# Patient Record
Sex: MALE | Race: WHITE | Employment: FULL TIME | URBAN - METROPOLITAN AREA
[De-identification: names, ages, dates, MRNs, and addresses within clinical notes are randomized per-mention and may not be internally consistent; named-entity substitution may affect disease eponyms.]

---

## 2018-03-27 ENCOUNTER — OFFICE VISIT (OUTPATIENT)
Dept: PRIMARY CARE CLINIC | Age: 38
End: 2018-03-27

## 2018-03-27 VITALS
SYSTOLIC BLOOD PRESSURE: 128 MMHG | BODY MASS INDEX: 26.34 KG/M2 | HEART RATE: 80 BPM | DIASTOLIC BLOOD PRESSURE: 80 MMHG | RESPIRATION RATE: 16 BRPM | WEIGHT: 167.8 LBS | HEIGHT: 67 IN | OXYGEN SATURATION: 99 % | TEMPERATURE: 97.9 F

## 2018-03-27 DIAGNOSIS — H69.83 DYSFUNCTION OF BOTH EUSTACHIAN TUBES: ICD-10-CM

## 2018-03-27 DIAGNOSIS — J06.9 ACUTE UPPER RESPIRATORY INFECTION: Primary | ICD-10-CM

## 2018-03-27 PROCEDURE — 99202 OFFICE O/P NEW SF 15 MIN: CPT | Performed by: NURSE PRACTITIONER

## 2018-03-27 RX ORDER — AMOXICILLIN AND CLAVULANATE POTASSIUM 875; 125 MG/1; MG/1
1 TABLET, FILM COATED ORAL 2 TIMES DAILY
Qty: 14 TABLET | Refills: 0 | Status: SHIPPED | OUTPATIENT
Start: 2018-03-27 | End: 2018-04-03

## 2018-03-27 RX ORDER — METHYLPREDNISOLONE 4 MG/1
TABLET ORAL
Qty: 1 KIT | Refills: 0 | Status: SHIPPED | OUTPATIENT
Start: 2018-03-27 | End: 2018-04-02

## 2018-03-27 RX ORDER — BENZONATATE 100 MG/1
100 CAPSULE ORAL 3 TIMES DAILY PRN
Qty: 21 CAPSULE | Refills: 0 | Status: SHIPPED | OUTPATIENT
Start: 2018-03-27 | End: 2018-04-03

## 2018-03-27 ASSESSMENT — ENCOUNTER SYMPTOMS
VOMITING: 0
ABDOMINAL PAIN: 0
SORE THROAT: 0
DIARRHEA: 0
NAUSEA: 0
SINUS PAIN: 0
EYES NEGATIVE: 1
WHEEZING: 0
GASTROINTESTINAL NEGATIVE: 1
SPUTUM PRODUCTION: 1
SHORTNESS OF BREATH: 0
COUGH: 1

## 2018-03-27 ASSESSMENT — PATIENT HEALTH QUESTIONNAIRE - PHQ9
SUM OF ALL RESPONSES TO PHQ9 QUESTIONS 1 & 2: 0
SUM OF ALL RESPONSES TO PHQ QUESTIONS 1-9: 0
2. FEELING DOWN, DEPRESSED OR HOPELESS: 0
1. LITTLE INTEREST OR PLEASURE IN DOING THINGS: 0

## 2018-03-27 NOTE — PROGRESS NOTES
Subjective     CC:   Chief Complaint   Patient presents with    URI     cough, chest congestion(clear), right ear pressure/popping, and chills intermitted x 1 week. has taken dayquil. CHERRY Chapman is a 44 yr male that presents for sinus symptoms x 1 week. Reports cough that recently became productive with clear sputum, a little dizziness/light headed, chills which have improved, mild congestion, R ear pain, decreased appetite. Denies sinus pain, fever, ear drainage, sore throat, n/v/abdominal pain, PND, chest pain, sob, wheezing. Has tried dayquil which relieves symptoms and cough drops. Did not get flu shot this year. Recent travel [facility] Former smoker-stopped 5-6 years ago. Previously smoker 10-12 years x 0.5 pack per day     Review of Systems   Constitutional: Positive for chills. Negative for fever. HENT: Positive for congestion and ear pain. Negative for ear discharge, sinus pain and sore throat. Eyes: Negative. Respiratory: Positive for cough and sputum production (clear). Negative for shortness of breath and wheezing. Cardiovascular: Negative for chest pain. Gastrointestinal: Negative. Negative for abdominal pain, diarrhea, nausea and vomiting. Genitourinary: Negative. Musculoskeletal: Negative. Skin: Negative. Neurological: Positive for dizziness (lightheaded). Negative for headaches. Psychiatric/Behavioral: Negative. Objective   Vitals:    03/27/18 1419   BP: 128/80   Site: Right Arm   Position: Sitting   Cuff Size: Small Adult   Pulse: 80   Resp: 16   Temp: 97.9 °F (36.6 °C)   TempSrc: Oral   SpO2: 99%   Weight: 167 lb 12.8 oz (76.1 kg)   Height: 5' 7.25\" (1.708 m)     Body mass index is 26.09 kg/m². Wt Readings from Last 3 Encounters:   03/27/18 167 lb 12.8 oz (76.1 kg)     BP Readings from Last 3 Encounters:   03/27/18 128/80      Physical Exam   Constitutional: He is oriented to person, place, and time.  Vital signs are normal. He appears well-developed and well-nourished. HENT:   Head: Normocephalic. Right Ear: Hearing, external ear and ear canal normal. Tympanic membrane is not injected, not scarred, not perforated, not erythematous, not retracted and not bulging. Left Ear: Hearing, external ear and ear canal normal. Tympanic membrane is not injected, not scarred, not perforated, not erythematous, not retracted and not bulging. Nose: Nose normal. Right sinus exhibits no maxillary sinus tenderness and no frontal sinus tenderness. Left sinus exhibits no maxillary sinus tenderness and no frontal sinus tenderness. Mouth/Throat: Uvula is midline, oropharynx is clear and moist and mucous membranes are normal.   R TM cloudy, L TM with effusion   Eyes: Conjunctivae and EOM are normal. Pupils are equal, round, and reactive to light. Neck: Normal range of motion. Neck supple. Cardiovascular: Normal rate, regular rhythm, normal heart sounds, intact distal pulses and normal pulses. Exam reveals no gallop and no friction rub. No murmur heard. Pulmonary/Chest: Effort normal and breath sounds normal. No respiratory distress. He has no wheezes. He has no rales. He exhibits no tenderness. No forced expiratory wheeze   Abdominal: Normal appearance. There is no tenderness. There is no guarding. Musculoskeletal: Normal range of motion. Lymphadenopathy:     He has no cervical adenopathy. Neurological: He is alert and oriented to person, place, and time. He has normal strength. Skin: Skin is warm and dry. Psychiatric: He has a normal mood and affect. His behavior is normal. Judgment and thought content normal.   Vitals reviewed. 1. Acute upper respiratory infection  methylPREDNISolone (MEDROL, EILEEN,) 4 MG tablet    benzonatate (TESSALON PERLES) 100 MG capsule    amoxicillin-clavulanate (AUGMENTIN) 875-125 MG per tablet   2. Dysfunction of both eustachian tubes  methylPREDNISolone (MEDROL, EILEEN,) 4 MG tablet           Plan   1.  Acute upper respiratory

## 2019-06-27 ENCOUNTER — OFFICE VISIT (OUTPATIENT)
Dept: FAMILY MEDICINE CLINIC | Age: 39
End: 2019-06-27
Payer: COMMERCIAL

## 2019-06-27 VITALS
HEART RATE: 104 BPM | SYSTOLIC BLOOD PRESSURE: 132 MMHG | OXYGEN SATURATION: 97 % | BODY MASS INDEX: 23.9 KG/M2 | DIASTOLIC BLOOD PRESSURE: 74 MMHG | HEIGHT: 67 IN | WEIGHT: 152.25 LBS

## 2019-06-27 DIAGNOSIS — K21.9 GASTROESOPHAGEAL REFLUX DISEASE WITHOUT ESOPHAGITIS: ICD-10-CM

## 2019-06-27 DIAGNOSIS — R10.13 EPIGASTRIC PAIN: Primary | ICD-10-CM

## 2019-06-27 DIAGNOSIS — F10.10 ALCOHOL ABUSE: ICD-10-CM

## 2019-06-27 DIAGNOSIS — R19.7 DIARRHEA, UNSPECIFIED TYPE: ICD-10-CM

## 2019-06-27 LAB
BASOPHILS ABSOLUTE: 0 K/UL (ref 0–0.2)
BASOPHILS RELATIVE PERCENT: 0.5 %
EOSINOPHILS ABSOLUTE: 0.1 K/UL (ref 0–0.6)
EOSINOPHILS RELATIVE PERCENT: 0.9 %
HCT VFR BLD CALC: 41 % (ref 40.5–52.5)
HEMOGLOBIN: 14 G/DL (ref 13.5–17.5)
LYMPHOCYTES ABSOLUTE: 1.5 K/UL (ref 1–5.1)
LYMPHOCYTES RELATIVE PERCENT: 22.6 %
MCH RBC QN AUTO: 33.4 PG (ref 26–34)
MCHC RBC AUTO-ENTMCNC: 34.2 G/DL (ref 31–36)
MCV RBC AUTO: 97.6 FL (ref 80–100)
MONOCYTES ABSOLUTE: 0.4 K/UL (ref 0–1.3)
MONOCYTES RELATIVE PERCENT: 5.9 %
NEUTROPHILS ABSOLUTE: 4.7 K/UL (ref 1.7–7.7)
NEUTROPHILS RELATIVE PERCENT: 70.1 %
PDW BLD-RTO: 12.8 % (ref 12.4–15.4)
PLATELET # BLD: 237 K/UL (ref 135–450)
PMV BLD AUTO: 10.1 FL (ref 5–10.5)
RBC # BLD: 4.2 M/UL (ref 4.2–5.9)
WBC # BLD: 6.7 K/UL (ref 4–11)

## 2019-06-27 PROCEDURE — 1036F TOBACCO NON-USER: CPT | Performed by: INTERNAL MEDICINE

## 2019-06-27 PROCEDURE — 99203 OFFICE O/P NEW LOW 30 MIN: CPT | Performed by: INTERNAL MEDICINE

## 2019-06-27 PROCEDURE — 36415 COLL VENOUS BLD VENIPUNCTURE: CPT | Performed by: INTERNAL MEDICINE

## 2019-06-27 PROCEDURE — G8420 CALC BMI NORM PARAMETERS: HCPCS | Performed by: INTERNAL MEDICINE

## 2019-06-27 PROCEDURE — G8427 DOCREV CUR MEDS BY ELIG CLIN: HCPCS | Performed by: INTERNAL MEDICINE

## 2019-06-27 RX ORDER — OMEPRAZOLE 40 MG/1
40 CAPSULE, DELAYED RELEASE ORAL
Qty: 30 CAPSULE | Refills: 5 | Status: SHIPPED | OUTPATIENT
Start: 2019-06-27 | End: 2022-05-09

## 2019-06-27 ASSESSMENT — PATIENT HEALTH QUESTIONNAIRE - PHQ9
SUM OF ALL RESPONSES TO PHQ QUESTIONS 1-9: 0
1. LITTLE INTEREST OR PLEASURE IN DOING THINGS: 0
SUM OF ALL RESPONSES TO PHQ QUESTIONS 1-9: 0
2. FEELING DOWN, DEPRESSED OR HOPELESS: 0
SUM OF ALL RESPONSES TO PHQ9 QUESTIONS 1 & 2: 0

## 2019-06-27 NOTE — PROGRESS NOTES
Calvin Raines   1980      Reason for visit:   Chief Complaint   Patient presents with   Agnes Jose L Ndiaye New Doctor    Irritable Bowel Syndrome        HPI:  Patient presents to [facility]. He has not seen his doctor for a year and their office closed. He is generally healthy. He takes no prescription  medications. He does take OTC prilosec for GERD. He feels the OTC is effective but he does not follow a good diet. His BP is a bit borderline today but he reports it has never been a problem. He does have issues with diarrhea. He reports he started having this issue when he traveled in May to [place] [address] but he was not ill at that time. He tried immodium without relief. He does admit to epigastric pain that started the same time. Usually occurs in the morning and tends to go away after he has a BM. He does have cramping in his abdomen. No blood in his stool that he has seen. 2 loose stools per day. No oily stools. His grandfather  of colon cancer. He drinks 5 beers per day for the last 10 years. He does not take any NSAIDs. He does not smoke - quit 6 years ago. History reviewed. No pertinent past medical history. Current Outpatient Medications:     omeprazole (PRILOSEC) 40 MG delayed release capsule, Take 1 capsule by mouth every morning (before breakfast), Disp: 30 capsule, Rfl: 5     No Known Allergies    History reviewed. No pertinent surgical history.      Family History   Problem Relation Age of Onset    No Known Problems Mother     High Blood Pressure Father     High Cholesterol Father     No Known Problems Brother     Cancer Paternal Grandfather         Social History     Socioeconomic History    Marital status: Single     Spouse name: Not on file    Number of children: Not on file    Years of education: Not on file    Highest education level: Not on file   Occupational History    Not on file   Social Needs    Financial resource strain: Not on file    Food insecurity:     Worry: Not on file     Inability: Not on file    Transportation needs:     Medical: Not on file     Non-medical: Not on file   Tobacco Use    Smoking status: Former Smoker     Packs/day: 0.50     Types: Cigarettes     Last attempt to quit: 2013     Years since quittin.4    Smokeless tobacco: Never Used   Substance and Sexual Activity    Alcohol use: Yes     Alcohol/week: 3.6 oz     Types: 6 Cans of beer per week     Comment: social    Drug use: No    Sexual activity: Not Currently   Lifestyle    Physical activity:     Days per week: Not on file     Minutes per session: Not on file    Stress: Not on file   Relationships    Social connections:     Talks on phone: Not on file     Gets together: Not on file     Attends Mandaeism service: Not on file     Active member of club or organization: Not on file     Attends meetings of clubs or organizations: Not on file     Relationship status: Not on file    Intimate partner violence:     Fear of current or ex partner: Not on file     Emotionally abused: Not on file     Physically abused: Not on file     Forced sexual activity: Not on file   Other Topics Concern    Not on file   Social History Narrative    Not on file       Review of Systems   Constitutional: Negative for chills, fatigue, fever and unexpected weight change. HENT: Negative for congestion, ear pain, sore throat and trouble swallowing. Eyes: Negative for pain and redness. Respiratory: Negative for cough and shortness of breath. Cardiovascular: Negative for chest pain, palpitations and leg swelling. Gastrointestinal: Negative for+ abdominal pain, +diarrhea; no blood in stool, constipation,  nausea and vomiting. Endocrine: Negative for cold intolerance, heat intolerance, polydipsia and polyuria. Genitourinary: Negative for dysuria, frequency and hematuria. Musculoskeletal: Negative for arthralgias, myalgias and joint swelling. Skin: Negative for rash.    Neurological: Negative for weakness, numbness and headaches. Hematological: Negative for adenopathy. Does not bruise/bleed easily. Psychiatric/Behavioral: Negative for sleep disturbance. The patient is not nervous/anxious. No report of depression    Physical Exam:  /74   Pulse 104   Ht 5' 7\" (1.702 m)   Wt 152 lb 4 oz (69.1 kg)   SpO2 97%   BMI 23.85 kg/m²   Constitutional: appears well-developed and well-nourished. Head: Normocephalic and atraumatic. Eyes: Pupils are equal, round, and reactive to light. Conjunctivae and EOM are normal.   Right Ear: External ear normal. TM normal  Left Ear: External ear normal. TM normal  Nose: Nose normal.   Mouth/Throat: Oropharynx is clear and moist.   Cardiovascular: Normal rate, regular rhythm and normal heart sounds. No murmur heard. Pulmonary/Chest: Effort normal. No respiratory distress. No wheezes, rhonchi, or crackles  Abdominal: Soft. Bowel sounds are normal. No distension. There is  Tenderness in epigastrium. No rebound or guarding. Musculoskeletal: Normal range of motion. No edema, tenderness or deformity. Lymphadenopathy: No cervical adenopathy. Neurological: alert. No cranial nerve deficit. Skin: Skin is warm and dry. Capillary refill takes less than 2 seconds. No rash noted. Psychiatric: Normal mood and affect. Assessment and Plan: Alyse Rolon is a 35 y.o. male presenting today to establish care. Arely Goldman was seen today for established new doctor and irritable bowel syndrome. Diagnoses and all orders for this visit:    Epigastric pain  Suspect severe gastritis or peptic ulcer disease with alcohol as the inciting agent. We will run basic lab testing today. He will start prescription PPI. Discussed referral to gastroenterology for evaluation with endoscopy. He will consider this but he is inclined to hold off on it at this time.   I urged him to consider seeing them if pain does not improve with medication or should he develop any worsening symptoms including blood in his stool or melena. He voiced his agreement and understanding  -     CBC Auto Differential  -     Comprehensive Metabolic Panel  -     TSH with Reflex  -     Lipase  -     AFL - Stephanie Garza MD, Gastroenterology, [facility]    Alcohol abuse  We had a long discussion regarding his alcohol use and that that it poses to his health. Recommend cutting back to 3-4 drinks per week. He is going to work on this and we will see him in close follow-up to monitor his progress. Gastroesophageal reflux disease without esophagitis    Diarrhea, unspecified type  -     TSH with Reflex  -     AFL - Stephanie Garza MD, Gastroenterology, [facility]    Other orders  -     omeprazole (PRILOSEC) 40 MG delayed release capsule; Take 1 capsule by mouth every morning (before breakfast)    Return to clinic in 4 weeks    Anh Juan M.D. Internal Medicine and Pediatrics  [facility]    Please be advised that portions of this note were dictated with the use of Dragon which may result in linguistic errors. Please contact me should there be any questions.

## 2019-06-28 LAB
A/G RATIO: 1.8 (ref 1.1–2.2)
ALBUMIN SERPL-MCNC: 5.2 G/DL (ref 3.4–5)
ALP BLD-CCNC: 56 U/L (ref 40–129)
ALT SERPL-CCNC: 45 U/L (ref 10–40)
ANION GAP SERPL CALCULATED.3IONS-SCNC: 21 MMOL/L (ref 3–16)
AST SERPL-CCNC: 40 U/L (ref 15–37)
BILIRUB SERPL-MCNC: 0.3 MG/DL (ref 0–1)
BUN BLDV-MCNC: 12 MG/DL (ref 7–20)
CALCIUM SERPL-MCNC: 10.1 MG/DL (ref 8.3–10.6)
CHLORIDE BLD-SCNC: 98 MMOL/L (ref 99–110)
CO2: 21 MMOL/L (ref 21–32)
CREAT SERPL-MCNC: 0.8 MG/DL (ref 0.9–1.3)
GFR AFRICAN AMERICAN: >60
GFR NON-AFRICAN AMERICAN: >60
GLOBULIN: 2.9 G/DL
GLUCOSE BLD-MCNC: 114 MG/DL (ref 70–99)
LIPASE: 29 U/L (ref 13–60)
POTASSIUM SERPL-SCNC: 4.2 MMOL/L (ref 3.5–5.1)
SODIUM BLD-SCNC: 140 MMOL/L (ref 136–145)
TOTAL PROTEIN: 8.1 G/DL (ref 6.4–8.2)
TSH REFLEX: 1.78 UIU/ML (ref 0.27–4.2)

## 2019-07-02 ENCOUNTER — TELEPHONE (OUTPATIENT)
Dept: FAMILY MEDICINE CLINIC | Age: 39
End: 2019-07-02

## 2019-07-02 ENCOUNTER — HOSPITAL ENCOUNTER (OUTPATIENT)
Age: 39
Discharge: HOME | End: 2019-07-02
Payer: COMMERCIAL

## 2019-07-02 ENCOUNTER — OFFICE VISIT (OUTPATIENT)
Dept: FAMILY MEDICINE CLINIC | Age: 39
End: 2019-07-02
Payer: COMMERCIAL

## 2019-07-02 ENCOUNTER — HOSPITAL ENCOUNTER (OUTPATIENT)
Dept: GENERAL RADIOLOGY | Age: 39
Discharge: HOME | End: 2019-07-02
Payer: COMMERCIAL

## 2019-07-02 VITALS
WEIGHT: 172.5 LBS | SYSTOLIC BLOOD PRESSURE: 114 MMHG | HEART RATE: 101 BPM | BODY MASS INDEX: 27.07 KG/M2 | DIASTOLIC BLOOD PRESSURE: 78 MMHG | HEIGHT: 67 IN | OXYGEN SATURATION: 98 %

## 2019-07-02 DIAGNOSIS — S99.922A TOE INJURY, LEFT, INITIAL ENCOUNTER: ICD-10-CM

## 2019-07-02 DIAGNOSIS — S99.922A TOE INJURY, LEFT, INITIAL ENCOUNTER: Primary | ICD-10-CM

## 2019-07-02 PROCEDURE — G8427 DOCREV CUR MEDS BY ELIG CLIN: HCPCS | Performed by: INTERNAL MEDICINE

## 2019-07-02 PROCEDURE — G8419 CALC BMI OUT NRM PARAM NOF/U: HCPCS | Performed by: INTERNAL MEDICINE

## 2019-07-02 PROCEDURE — 99213 OFFICE O/P EST LOW 20 MIN: CPT | Performed by: INTERNAL MEDICINE

## 2019-07-02 PROCEDURE — 1036F TOBACCO NON-USER: CPT | Performed by: INTERNAL MEDICINE

## 2019-07-02 PROCEDURE — 73660 X-RAY EXAM OF TOE(S): CPT

## 2019-07-03 ENCOUNTER — TELEPHONE (OUTPATIENT)
Dept: FAMILY MEDICINE CLINIC | Age: 39
End: 2019-07-03

## 2019-07-03 NOTE — TELEPHONE ENCOUNTER
Pt called in regarding message about his xray. Informed pt of Dr. Alejandra Corcoran note. Pt says however the pain has moved into his ankle and he can't hardly walk.      Please give pt a call back: [phone]

## 2019-07-29 ENCOUNTER — OFFICE VISIT (OUTPATIENT)
Dept: FAMILY MEDICINE CLINIC | Age: 39
End: 2019-07-29
Payer: COMMERCIAL

## 2019-07-29 VITALS
DIASTOLIC BLOOD PRESSURE: 68 MMHG | BODY MASS INDEX: 24.33 KG/M2 | HEIGHT: 67 IN | SYSTOLIC BLOOD PRESSURE: 116 MMHG | OXYGEN SATURATION: 98 % | HEART RATE: 76 BPM | WEIGHT: 155 LBS

## 2019-07-29 DIAGNOSIS — R74.8 ELEVATED LIVER ENZYMES: ICD-10-CM

## 2019-07-29 DIAGNOSIS — K21.9 GASTROESOPHAGEAL REFLUX DISEASE WITHOUT ESOPHAGITIS: Primary | ICD-10-CM

## 2019-07-29 LAB
A/G RATIO: 1.7 (ref 1.1–2.2)
ALBUMIN SERPL-MCNC: 4.9 G/DL (ref 3.4–5)
ALP BLD-CCNC: 67 U/L (ref 40–129)
ALT SERPL-CCNC: 43 U/L (ref 10–40)
ANION GAP SERPL CALCULATED.3IONS-SCNC: 16 MMOL/L (ref 3–16)
AST SERPL-CCNC: 27 U/L (ref 15–37)
BILIRUB SERPL-MCNC: <0.2 MG/DL (ref 0–1)
BUN BLDV-MCNC: 12 MG/DL (ref 7–20)
CALCIUM SERPL-MCNC: 10.4 MG/DL (ref 8.3–10.6)
CHLORIDE BLD-SCNC: 99 MMOL/L (ref 99–110)
CO2: 26 MMOL/L (ref 21–32)
CREAT SERPL-MCNC: 0.8 MG/DL (ref 0.9–1.3)
GFR AFRICAN AMERICAN: >60
GFR NON-AFRICAN AMERICAN: >60
GLOBULIN: 2.9 G/DL
GLUCOSE BLD-MCNC: 94 MG/DL (ref 70–99)
POTASSIUM SERPL-SCNC: 4.2 MMOL/L (ref 3.5–5.1)
SODIUM BLD-SCNC: 141 MMOL/L (ref 136–145)
TOTAL PROTEIN: 7.8 G/DL (ref 6.4–8.2)

## 2019-07-29 PROCEDURE — 1036F TOBACCO NON-USER: CPT | Performed by: INTERNAL MEDICINE

## 2019-07-29 PROCEDURE — 36415 COLL VENOUS BLD VENIPUNCTURE: CPT | Performed by: INTERNAL MEDICINE

## 2019-07-29 PROCEDURE — G8420 CALC BMI NORM PARAMETERS: HCPCS | Performed by: INTERNAL MEDICINE

## 2019-07-29 PROCEDURE — G8427 DOCREV CUR MEDS BY ELIG CLIN: HCPCS | Performed by: INTERNAL MEDICINE

## 2019-07-29 PROCEDURE — 99213 OFFICE O/P EST LOW 20 MIN: CPT | Performed by: INTERNAL MEDICINE

## 2019-07-29 ASSESSMENT — ENCOUNTER SYMPTOMS
VOMITING: 0
CONSTIPATION: 0
NAUSEA: 0
BLOOD IN STOOL: 0
DIARRHEA: 0
ABDOMINAL PAIN: 0

## 2019-07-29 NOTE — PROGRESS NOTES
ASSESSMENT/PLAN:  1. Elevated liver enzymes  Suspect related to his alcohol intake. We will follow this up to trend. Consider ultrasound pending results  - Comprehensive Metabolic Panel    2. Gastroesophageal reflux disease without esophagitis  Continue PPI. We discussed tapering off this if he is able to, however, I suspect he will continue to have issues as long as he is drinking. Encouraged him to continue to cut back with goal to stop drinking altogether. He still has a referral to gastroenterology but does not plan on seeing them at this time, however, I urged him to do so should he have any recurrent symptoms or other concerns. He voiced his agreement understanding. Return in about 6 months (around 1/29/2020). An electronic signature was used to authenticate this note.     --Suzi Garces MD on 7/29/2019 at 11:13 AM

## 2020-04-02 ENCOUNTER — TELEPHONE (OUTPATIENT)
Dept: FAMILY MEDICINE CLINIC | Age: 40
End: 2020-04-02

## 2020-04-02 NOTE — TELEPHONE ENCOUNTER
Pt calling back stating needing cortisone injection in knee. Wanting to know where he could go for that. Please advise.  Please call Bernadette Willingham back at [phone]

## 2020-04-02 NOTE — TELEPHONE ENCOUNTER
Pt's mother Pancho All called in stating that PT has gout in his left foot and now his left knee is swollen. She thinks it may be a flare up but she's not sure. Did adv her that we are not seeing in patient visits (she wanted PT to come in for a cortisone injection). Explained to her that our doctors are doing virtual visits for now. She would like for some to reach out to her son to adv what he can do to have his knee examined.      Please call PT back on his number: [phone]

## 2020-04-03 ENCOUNTER — HOSPITAL ENCOUNTER (EMERGENCY)
Age: 40
Discharge: HOME | End: 2020-04-03
Payer: COMMERCIAL

## 2020-04-03 ENCOUNTER — APPOINTMENT (OUTPATIENT)
Dept: GENERAL RADIOLOGY | Age: 40
End: 2020-04-03
Payer: COMMERCIAL

## 2020-04-03 VITALS
RESPIRATION RATE: 20 BRPM | WEIGHT: 154.32 LBS | SYSTOLIC BLOOD PRESSURE: 147 MMHG | HEIGHT: 68 IN | BODY MASS INDEX: 23.39 KG/M2 | DIASTOLIC BLOOD PRESSURE: 84 MMHG | TEMPERATURE: 98.1 F | HEART RATE: 105 BPM | OXYGEN SATURATION: 100 %

## 2020-04-03 PROCEDURE — 73560 X-RAY EXAM OF KNEE 1 OR 2: CPT

## 2020-04-03 PROCEDURE — 99283 EMERGENCY DEPT VISIT LOW MDM: CPT

## 2020-04-03 RX ORDER — PREDNISONE 10 MG/1
TABLET ORAL
Qty: 18 TABLET | Refills: 0 | Status: SHIPPED | OUTPATIENT
Start: 2020-04-03 | End: 2020-04-13

## 2020-04-03 ASSESSMENT — PAIN DESCRIPTION - DESCRIPTORS: DESCRIPTORS: ACHING

## 2020-04-03 ASSESSMENT — PAIN DESCRIPTION - PAIN TYPE: TYPE: ACUTE PAIN

## 2020-04-03 ASSESSMENT — PAIN DESCRIPTION - LOCATION: LOCATION: KNEE

## 2020-04-03 ASSESSMENT — PAIN SCALES - GENERAL: PAINLEVEL_OUTOF10: 5

## 2020-04-03 ASSESSMENT — PAIN DESCRIPTION - FREQUENCY: FREQUENCY: CONTINUOUS

## 2020-04-03 ASSESSMENT — PAIN - FUNCTIONAL ASSESSMENT
PAIN_FUNCTIONAL_ASSESSMENT: ACTIVITIES ARE NOT PREVENTED
PAIN_FUNCTIONAL_ASSESSMENT: 0-10

## 2020-04-03 ASSESSMENT — PAIN DESCRIPTION - ORIENTATION: ORIENTATION: LEFT

## 2020-04-03 ASSESSMENT — PAIN DESCRIPTION - PROGRESSION: CLINICAL_PROGRESSION: NOT CHANGED

## 2020-04-03 NOTE — ED PROVIDER NOTES
**EVALUATED BY ADVANCED PRACTICE PROVIDER**        [address]      Pt Name: Feroz Huynh  IJB:[id number]  [place] 1980  Date of evaluation: 4/3/2020  Provider: Francesco Armenta PA-C      Chief Complaint:    Chief Complaint   Patient presents with    Knee Pain     L knee swollen and painful for 2 days. , No injury. HX of gout. Nursing Notes, Past Medical Hx, Past Surgical Hx, Social Hx, Allergies, and Family Hx were all reviewed and agreed with or any disagreements were addressed in the HPI.    HPI:  (Location, Duration, Timing, Severity, Quality, Assoc Sx, Context, Modifying factors)  This is a  35 y.o. male patient presenting with 3 days of progressive anterior left knee pain. He is . On his knees quite a bit. He has been  for many years. He is never had this happen before. He does have history of gout. Denies any fevers, chills or direct trauma. No recent injury such as an abrasion. PastMedical/Surgical History:  No past medical history on file. No past surgical history on file. Medications:  Previous Medications    OMEPRAZOLE (PRILOSEC) 40 MG DELAYED RELEASE CAPSULE    Take 1 capsule by mouth every morning (before breakfast)         Review of Systems:  Review of Systems  Positives and Pertinent negatives as per HPI. Except as noted above in the ROS, problem specific ROS was completed and is negative. Physical Exam:  Physical Exam  Vitals signs and nursing note reviewed. Constitutional:       Appearance: Normal appearance. He is well-developed and normal weight. HENT:      Head: Normocephalic and atraumatic. Right Ear: External ear normal.      Left Ear: External ear normal.   Eyes:      General: No scleral icterus. Right eye: No discharge. Left eye: No discharge.       Conjunctiva/sclera: Conjunctivae normal.   Neck:      Musculoskeletal: Normal range of motion and DISCONTINUED MEDICATIONS:  Discontinued Medications    No medications on file              (Please note the MDM and HPI sections of this note were completed with a voice recognition program.  Efforts were made to edit the dictations but occasionally words are mis-transcribed.)    Electronically signed, Makenna Ventura PA-C  04/03/20 1922

## 2020-04-03 NOTE — TELEPHONE ENCOUNTER
Not an option Ortho not seeing patients without threat of loss of function. Gout or arthritis do no qualify.

## 2020-11-17 RX ORDER — BENZONATATE 100 MG/1
100 CAPSULE ORAL 3 TIMES DAILY PRN
Qty: 30 CAPSULE | Refills: 0 | Status: SHIPPED | OUTPATIENT
Start: 2020-11-17 | End: 2020-11-27

## 2020-11-17 RX ORDER — BENZONATATE 100 MG/1
100 CAPSULE ORAL 3 TIMES DAILY PRN
Qty: 30 CAPSULE | Refills: 0 | Status: SHIPPED | OUTPATIENT
Start: 2020-11-17 | End: 2020-11-17 | Stop reason: SDUPTHER

## 2021-06-10 ENCOUNTER — OFFICE VISIT (OUTPATIENT)
Dept: ORTHOPEDIC SURGERY | Age: 41
End: 2021-06-10
Payer: COMMERCIAL

## 2021-06-10 ENCOUNTER — TELEPHONE (OUTPATIENT)
Dept: ORTHOPEDIC SURGERY | Age: 41
End: 2021-06-10

## 2021-06-10 VITALS
HEIGHT: 64 IN | SYSTOLIC BLOOD PRESSURE: 133 MMHG | WEIGHT: 154 LBS | HEART RATE: 82 BPM | BODY MASS INDEX: 26.29 KG/M2 | DIASTOLIC BLOOD PRESSURE: 88 MMHG

## 2021-06-10 DIAGNOSIS — M70.41 PREPATELLAR BURSITIS, RIGHT KNEE: ICD-10-CM

## 2021-06-10 DIAGNOSIS — M25.561 RIGHT KNEE PAIN, UNSPECIFIED CHRONICITY: Primary | ICD-10-CM

## 2021-06-10 PROCEDURE — G8427 DOCREV CUR MEDS BY ELIG CLIN: HCPCS | Performed by: PHYSICIAN ASSISTANT

## 2021-06-10 PROCEDURE — 1036F TOBACCO NON-USER: CPT | Performed by: PHYSICIAN ASSISTANT

## 2021-06-10 PROCEDURE — G8419 CALC BMI OUT NRM PARAM NOF/U: HCPCS | Performed by: PHYSICIAN ASSISTANT

## 2021-06-10 PROCEDURE — 99204 OFFICE O/P NEW MOD 45 MIN: CPT | Performed by: PHYSICIAN ASSISTANT

## 2021-06-10 RX ORDER — CEPHALEXIN 500 MG/1
500 CAPSULE ORAL 4 TIMES DAILY
Qty: 28 CAPSULE | Refills: 0 | Status: SHIPPED | OUTPATIENT
Start: 2021-06-10 | End: 2021-06-17

## 2021-06-10 RX ORDER — ALLOPURINOL 300 MG/1
TABLET ORAL WEEKLY
COMMUNITY
Start: 2021-03-25 | End: 2022-08-29 | Stop reason: SDUPTHER

## 2021-06-10 NOTE — PATIENT INSTRUCTIONS
if you can take an over-the-counter pain medicine, such as acetaminophen (Tylenol), ibuprofen (Advil, Motrin), or naproxen (Aleve). Be safe with medicines. Read and follow all instructions on the label. · To prevent and ease kneecap bursitis during work, play, and daily activities:  ? Wear kneepads when kneeling on hard surfaces. Avoid kneeling for too long at a time. ? Strengthen and stretch your leg muscles. ? Avoid deep knee bends. · You can slowly return to the activity that caused the pain, but do it with less effort until you can do it without pain or swelling. Be sure to warm up before and stretch after you do the activity. When should you call for help? Call your doctor now or seek immediate medical care if:    · You have a fever.     · You have increased swelling or redness in your knee area.     · You cannot use your knee, or the pain in your knee gets worse. Watch closely for changes in your health, and be sure to contact your doctor if:    · You have pain for 2 weeks or longer despite home treatment. Where can you learn more? Go to [email or web address] org and sign in to your [employer] account. Enter J071 in the [facility] box to learn more about \"Kneecap Bursitis: Care Instructions. \"     If you do not have an account, please click on the \"Sign Up Now\" link. Current as of: November 16, 2020               Content Version: 12.8  © 8243-1261 [employer]. Care instructions adapted under license by [facility] ([facility]). If you have questions about a medical condition or this instruction, always ask your healthcare professional. [place] [address] any warranty or liability for your use of this information.

## 2021-06-10 NOTE — PROGRESS NOTES
Subjective:      Patient ID: Tahira Arriola is a 38 y.o. male. Chief Complaint   Patient presents with    New Patient     right knee pain        HPI:   He is here for an initial evaluation of a new problem. Right knee pain. Symptoms began acutely 2021. He does not recall a specific injury. He does a lot of kneeling as an . Pain is primarily in the anterior aspect of the right knee. He denies fevers or chills. Pain Scale 8/10 VAS. Pain is worse with activity. Pain improves with elevation. Previous treatments have included NSAIDs without improvement. Review Of Systems:   A 14 point review of systems and history form completed by the patient has been reviewed. This scanned in the media tab of the patient's chart under today's date. As outlined in the HPI. Negative for fever or chills. Negative for polyp joint pain, swelling and stiffness. Negative for numbness or tingling. Personal history of gout. Currently on allopurinol. Past Medical History:   Diagnosis Date    Gout        Family History   Problem Relation Age of Onset    No Known Problems Mother     High Blood Pressure Father     High Cholesterol Father     No Known Problems Brother     Cancer Paternal Grandfather        History reviewed. No pertinent surgical history. Social History     Occupational History    Not on file   Tobacco Use    Smoking status: Former Smoker     Packs/day: 0.50     Types: Cigarettes     Quit date: 2013     Years since quittin.4    Smokeless tobacco: Never Used   Vaping Use    Vaping Use: Never used   Substance and Sexual Activity    Alcohol use:  Yes     Alcohol/week: 6.0 standard drinks     Types: 6 Cans of beer per week     Comment: social    Drug use: No    Sexual activity: Not Currently       Current Outpatient Medications   Medication Sig Dispense Refill    allopurinol (ZYLOPRIM) 300 MG tablet TAKE 1 TABLET BY MOUTH EVERY DAY      cephALEXin (Merril Helling) 500 MG capsule Take 1 capsule by mouth 4 times daily for 7 days 28 capsule 0    omeprazole (PRILOSEC) 40 MG delayed release capsule Take 1 capsule by mouth every morning (before breakfast) 30 capsule 5     No current facility-administered medications for this visit. Objective:     He is alert, oriented x 3, pleasant, well nourished, developed and in no   acute distress. /88 (Site: Left Upper Arm, Position: Sitting)   Pulse 82   Ht 5' 4\" (1.626 m)   Wt 154 lb (69.9 kg)   BMI 26.43 kg/m²      Examination of the right knee: Inspection of the skin minimal erythema over the anterior knee. .  Inspection of the soft tissues isolated soft tissue swelling over the prepatellar bursa and anterior knee. The overall alignment of the knee is neutral.  Gait is normal.  There is no intra-articular effusion. AROM:           PROM:  Extension-         0                   0  Flexion -           100                   100  There mild pain associated with ROM testing. Medial joint line is not tender to palpation. Lateral joint line is not tender to palpation. Pes anserine bursa is not tender to palpation. Patellar tendon is not tender to palpation. Quadriceps tendon is not tender to palpation. Collateral ligaments is not tender to palpation. Popliteal fossa is not tender to palpation. Varus Stress testing negative for pain or crepitus. Valgus Stress testing negative for pain or crepitus. Lachman's test negative for  ACL laxity. Anterior Drawer test negative for ACL laxity. Posterior Drawer test negative for PCL laxity. Moon's Test negative for meniscus tear. Patellar Compression testing negative for pain or crepitus. Extensor Mechanism is intact. Examination of the lower extremities are intact with sensation to light touch. Motor testing  5/5 in all major motor groups of the lower extremities. Negative Noble's Sign. SLR negative.       Examination of the lower extremities shows intact perfusion to all extremities. No cyanosis. Digits are warm to touch, capillary refill is less than 2 seconds. There is no edema noted. Examination of the skin over both lower extremities reveals: The skin to be intact without lacerations or abrasions. No significant erythema. No significant rashes or skin lesions. X Rays: performed in the office today:   AP Standing,Lateral and Sunrise of right Knee: Anterior soft tissue swelling noted in the prepatellar region. The alignment is anatomic. There are no radiographic findings to suggest fracture or dislocation. Additional Tests reviewed: none  Additional Outside Records reviewed: none    Diagnosis:       ICD-10-CM    1. Right knee pain, unspecified chronicity  M25.561 XR KNEE RIGHT (3 VIEWS)   2. Prepatellar bursitis, right knee  M70.41         Assessment and Plan:       Assessment:  Right anterior knee pain with right anterior knee swelling. I believe he has early prepatellar septic bursitis. He does have a history of gout but he does not have a joint effusion. Plan:  Medications-Keflex 500 mg 4 times daily. Strict elevation of the right lower extremity recommended. Warm compresses 3-4 times daily. Off work for the next 48 to 72 hours. Follow up-Monday, 6/14/2021  Call or return to clinic if these symptoms worsen or fail to improve as anticipated.

## 2021-06-14 ENCOUNTER — OFFICE VISIT (OUTPATIENT)
Dept: ORTHOPEDIC SURGERY | Age: 41
End: 2021-06-14
Payer: COMMERCIAL

## 2021-06-14 VITALS
HEIGHT: 64 IN | SYSTOLIC BLOOD PRESSURE: 126 MMHG | DIASTOLIC BLOOD PRESSURE: 78 MMHG | BODY MASS INDEX: 26.29 KG/M2 | WEIGHT: 154 LBS | HEART RATE: 91 BPM

## 2021-06-14 DIAGNOSIS — M70.41 PREPATELLAR BURSITIS, RIGHT KNEE: Primary | ICD-10-CM

## 2021-06-14 PROCEDURE — G8419 CALC BMI OUT NRM PARAM NOF/U: HCPCS | Performed by: PHYSICIAN ASSISTANT

## 2021-06-14 PROCEDURE — 99213 OFFICE O/P EST LOW 20 MIN: CPT | Performed by: PHYSICIAN ASSISTANT

## 2021-06-14 PROCEDURE — G8427 DOCREV CUR MEDS BY ELIG CLIN: HCPCS | Performed by: PHYSICIAN ASSISTANT

## 2021-06-14 PROCEDURE — 1036F TOBACCO NON-USER: CPT | Performed by: PHYSICIAN ASSISTANT

## 2021-06-15 NOTE — PROGRESS NOTES
Subjective:      Patient ID: Jack Staton is a 38 y.o. male. Chief Complaint   Patient presents with    Knee Pain     fu right knee pain        HPI:   He is here for follow up on his right knee, prepatellar septic bursitis. He has completed several days of antibiotics. He reports improvement of his symptoms. Review Of Systems:   Negative for fever or chills. Past Medical History:   Diagnosis Date    Gout        Family History   Problem Relation Age of Onset    No Known Problems Mother     High Blood Pressure Father     High Cholesterol Father     No Known Problems Brother     Cancer Paternal Grandfather        History reviewed. No pertinent surgical history. Social History     Occupational History    Not on file   Tobacco Use    Smoking status: Former Smoker     Packs/day: 0.50     Types: Cigarettes     Quit date: 2013     Years since quittin.4    Smokeless tobacco: Never Used   Vaping Use    Vaping Use: Never used   Substance and Sexual Activity    Alcohol use: Yes     Alcohol/week: 6.0 standard drinks     Types: 6 Cans of beer per week     Comment: social    Drug use: No    Sexual activity: Not Currently       Current Outpatient Medications   Medication Sig Dispense Refill    allopurinol (ZYLOPRIM) 300 MG tablet TAKE 1 TABLET BY MOUTH EVERY DAY      cephALEXin (KEFLEX) 500 MG capsule Take 1 capsule by mouth 4 times daily for 7 days 28 capsule 0    omeprazole (PRILOSEC) 40 MG delayed release capsule Take 1 capsule by mouth every morning (before breakfast) 30 capsule 5     No current facility-administered medications for this visit. Objective:     He is alert, oriented x 3, pleasant, well nourished, developed and in no   acute distress. /78   Pulse 91   Ht 5' 4\" (1.626 m)   Wt 154 lb (69.9 kg)   BMI 26.43 kg/m²      Examination of the right knee: Inspection of the skin minimal erythema over the anterior knee. .  Inspection of the soft tissues isolated soft tissue swelling over the prepatellar bursa and anterior knee. The overall alignment of the knee is neutral.  Gait is normal.  There is no intra-articular effusion. AROM:           PROM:  Extension-         0                   0  Flexion -           110                   110  There mild pain associated with ROM testing. Medial joint line is not tender to palpation. Lateral joint line is not tender to palpation. Pes anserine bursa is not tender to palpation. Patellar tendon is not tender to palpation. Quadriceps tendon is not tender to palpation. Collateral ligaments is not tender to palpation. Popliteal fossa is not tender to palpation. Varus Stress testing negative for pain or crepitus. Valgus Stress testing negative for pain or crepitus. Lachman's test negative for  ACL laxity. Anterior Drawer test negative for ACL laxity. Posterior Drawer test negative for PCL laxity. Moon's Test negative for meniscus tear. Patellar Compression testing negative for pain or crepitus. Extensor Mechanism is intact. X Rays: not performed in the office today:       Diagnosis:       ICD-10-CM    1. Prepatellar bursitis, right knee  M70.41         Assessment and Plan:       Assessment:  Improving right knee pain, swelling and erythema felt to be related to prepatellar bursitis. No joint effusion to suggest septic joint. Plan:  Medications-initial out the previously prescribed oral antibiotics, Keflex. PT-May begin gentle range of motion. Avoid kneeling or direct pressure on the anterior knee. Follow up- 1 week   Call or return to clinic if these symptoms worsen or fail to improve as anticipated.

## 2022-05-09 ENCOUNTER — OFFICE VISIT (OUTPATIENT)
Dept: FAMILY MEDICINE CLINIC | Age: 42
End: 2022-05-09
Payer: COMMERCIAL

## 2022-05-09 VITALS
HEART RATE: 100 BPM | TEMPERATURE: 98.3 F | OXYGEN SATURATION: 98 % | WEIGHT: 153 LBS | BODY MASS INDEX: 23.19 KG/M2 | DIASTOLIC BLOOD PRESSURE: 76 MMHG | SYSTOLIC BLOOD PRESSURE: 130 MMHG | HEIGHT: 68 IN

## 2022-05-09 DIAGNOSIS — F10.10 DYSFUNCTIONAL ALCOHOL USE: ICD-10-CM

## 2022-05-09 DIAGNOSIS — Z83.3 FAMILY HISTORY OF DIABETES MELLITUS: ICD-10-CM

## 2022-05-09 DIAGNOSIS — Z87.39 HISTORY OF GOUT: Primary | ICD-10-CM

## 2022-05-09 LAB
BASOPHILS ABSOLUTE: 0 K/UL (ref 0–0.2)
BASOPHILS RELATIVE PERCENT: 0.2 %
EOSINOPHILS ABSOLUTE: 0.1 K/UL (ref 0–0.6)
EOSINOPHILS RELATIVE PERCENT: 0.7 %
FOLATE: 15.66 NG/ML (ref 4.78–24.2)
HCT VFR BLD CALC: 40.6 % (ref 40.5–52.5)
HEMOGLOBIN: 14 G/DL (ref 13.5–17.5)
LYMPHOCYTES ABSOLUTE: 1.8 K/UL (ref 1–5.1)
LYMPHOCYTES RELATIVE PERCENT: 25.8 %
MCH RBC QN AUTO: 33 PG (ref 26–34)
MCHC RBC AUTO-ENTMCNC: 34.5 G/DL (ref 31–36)
MCV RBC AUTO: 95.7 FL (ref 80–100)
MONOCYTES ABSOLUTE: 0.4 K/UL (ref 0–1.3)
MONOCYTES RELATIVE PERCENT: 6.1 %
NEUTROPHILS ABSOLUTE: 4.8 K/UL (ref 1.7–7.7)
NEUTROPHILS RELATIVE PERCENT: 67.2 %
PDW BLD-RTO: 12.7 % (ref 12.4–15.4)
PLATELET # BLD: 244 K/UL (ref 135–450)
PMV BLD AUTO: 9.8 FL (ref 5–10.5)
RBC # BLD: 4.24 M/UL (ref 4.2–5.9)
VITAMIN B-12: 360 PG/ML (ref 211–911)
WBC # BLD: 7.2 K/UL (ref 4–11)

## 2022-05-09 PROCEDURE — 1036F TOBACCO NON-USER: CPT | Performed by: FAMILY MEDICINE

## 2022-05-09 PROCEDURE — G8427 DOCREV CUR MEDS BY ELIG CLIN: HCPCS | Performed by: FAMILY MEDICINE

## 2022-05-09 PROCEDURE — 99213 OFFICE O/P EST LOW 20 MIN: CPT | Performed by: FAMILY MEDICINE

## 2022-05-09 PROCEDURE — G8420 CALC BMI NORM PARAMETERS: HCPCS | Performed by: FAMILY MEDICINE

## 2022-05-09 PROCEDURE — 36415 COLL VENOUS BLD VENIPUNCTURE: CPT | Performed by: FAMILY MEDICINE

## 2022-05-09 RX ORDER — MULTIVITAMIN,THERAPEUTIC
1 TABLET ORAL DAILY
Qty: 30 TABLET | Refills: 3
Start: 2022-05-09 | End: 2022-08-29

## 2022-05-09 ASSESSMENT — PATIENT HEALTH QUESTIONNAIRE - PHQ9
SUM OF ALL RESPONSES TO PHQ QUESTIONS 1-9: 0
SUM OF ALL RESPONSES TO PHQ QUESTIONS 1-9: 0
1. LITTLE INTEREST OR PLEASURE IN DOING THINGS: 0
SUM OF ALL RESPONSES TO PHQ QUESTIONS 1-9: 0
2. FEELING DOWN, DEPRESSED OR HOPELESS: 0
SUM OF ALL RESPONSES TO PHQ9 QUESTIONS 1 & 2: 0
SUM OF ALL RESPONSES TO PHQ QUESTIONS 1-9: 0

## 2022-05-09 NOTE — PROGRESS NOTES
A/P:    Diagnosis Orders   1. History of gout  Comprehensive Metabolic Panel    CBC with Auto Differential   2. Family history of diabetes mellitus  Hemoglobin A1C   3. Dysfunctional alcohol use  Vitamin B12 & Folate     His gout is controlled, he will continue allopurinol and watching his diet, check above lab work    For his family history of diabetes, will check A1c, he is nonfasting today    For his dysfunctional alcohol use, he will think about cutting back, we discussed what is considered appropriate intake max for alcohol    Follow-up in 9 months or sooner if needed    O: /76   Pulse 100   Temp 98.3 °F (36.8 °C)   Ht 5' 8\" (1.727 m)   Wt 153 lb (69.4 kg)   SpO2 98%   BMI 23.26 kg/m²    Gen- NAD, pleasant  HEENT- Eyes without icterus or injection  Neck- Supple, no lymphadenopathy appreciated  Lungs- CTAB  Heart- RRR on my exam  Abd- Soft, non tender  Ext- No edema  Psych- Appropriate    S: CC-establish care  HPI-patient presents to establish care with new clinic provider. He reports that he is doing very well. He reports his gout is controlled on 1 allopurinol per week. He has no concerns today. He does drink at least 4 beers per day. He is CAGE negative.     ROS- ROS  Denies fever, chills, night sweats  Denies headaches, sore throat, ear pain  Denies chest pain, dyspnea, palpitations  Denies nausea, vomiting, diarrhea  Denies joint pain  Denies depression, anxiety  Denies rashes      Patient's history was reviewed and updated

## 2022-05-10 LAB
A/G RATIO: 1.6 (ref 1.1–2.2)
ALBUMIN SERPL-MCNC: 5.1 G/DL (ref 3.4–5)
ALP BLD-CCNC: 63 U/L (ref 40–129)
ALT SERPL-CCNC: 51 U/L (ref 10–40)
ANION GAP SERPL CALCULATED.3IONS-SCNC: 20 MMOL/L (ref 3–16)
AST SERPL-CCNC: 57 U/L (ref 15–37)
BILIRUB SERPL-MCNC: <0.2 MG/DL (ref 0–1)
BUN BLDV-MCNC: 14 MG/DL (ref 7–20)
CALCIUM SERPL-MCNC: 10.3 MG/DL (ref 8.3–10.6)
CHLORIDE BLD-SCNC: 99 MMOL/L (ref 99–110)
CO2: 20 MMOL/L (ref 21–32)
CREAT SERPL-MCNC: 1 MG/DL (ref 0.9–1.3)
ESTIMATED AVERAGE GLUCOSE: 111.2 MG/DL
GFR AFRICAN AMERICAN: >60
GFR NON-AFRICAN AMERICAN: >60
GLUCOSE BLD-MCNC: 125 MG/DL (ref 70–99)
HBA1C MFR BLD: 5.5 %
POTASSIUM SERPL-SCNC: 5.3 MMOL/L (ref 3.5–5.1)
SODIUM BLD-SCNC: 139 MMOL/L (ref 136–145)
TOTAL PROTEIN: 8.3 G/DL (ref 6.4–8.2)

## 2022-05-25 ENCOUNTER — TELEPHONE (OUTPATIENT)
Dept: FAMILY MEDICINE CLINIC | Age: 42
End: 2022-05-25

## 2022-05-25 RX ORDER — FOLIC ACID 1 MG/1
1 TABLET ORAL DAILY
Qty: 90 TABLET | Refills: 1 | COMMUNITY
Start: 2022-05-25 | End: 2022-08-29 | Stop reason: SDUPTHER

## 2022-05-25 RX ORDER — MULTIVIT-MIN/IRON FUM/FOLIC AC 7.5 MG-4
1 TABLET ORAL DAILY
Qty: 30 TABLET | Refills: 3 | COMMUNITY
Start: 2022-05-25 | End: 2022-08-29 | Stop reason: SDUPTHER

## 2022-05-25 NOTE — TELEPHONE ENCOUNTER
Please let Calista Hughes know his liver enzymes were a bit elevated. I would like him to work on cutting back on his alcohol. Alcohol will further damage the liver. His b12 is on the lower end of normal. With his alcohol use and this result, I would like him to take over the counter b12 7492 mcg daily, folic acid 1 mg daily, and a multivitamin daily. These vitamins are to help prevent deficiencies. I would like him to follow-up in 3 months.

## 2022-08-29 ENCOUNTER — OFFICE VISIT (OUTPATIENT)
Dept: FAMILY MEDICINE CLINIC | Age: 42
End: 2022-08-29
Payer: COMMERCIAL

## 2022-08-29 VITALS
HEIGHT: 68 IN | BODY MASS INDEX: 23.49 KG/M2 | WEIGHT: 155 LBS | DIASTOLIC BLOOD PRESSURE: 84 MMHG | OXYGEN SATURATION: 98 % | SYSTOLIC BLOOD PRESSURE: 131 MMHG | HEART RATE: 92 BPM

## 2022-08-29 DIAGNOSIS — F10.10 DYSFUNCTIONAL ALCOHOL USE: ICD-10-CM

## 2022-08-29 DIAGNOSIS — M10.9 GOUTY ARTHRITIS: ICD-10-CM

## 2022-08-29 DIAGNOSIS — R74.8 LIVER ENZYME ELEVATION: Primary | ICD-10-CM

## 2022-08-29 LAB
A/G RATIO: 1.8 (ref 1.1–2.2)
ALBUMIN SERPL-MCNC: 5.1 G/DL (ref 3.4–5)
ALP BLD-CCNC: 61 U/L (ref 40–129)
ALT SERPL-CCNC: 43 U/L (ref 10–40)
ANION GAP SERPL CALCULATED.3IONS-SCNC: 15 MMOL/L (ref 3–16)
AST SERPL-CCNC: 29 U/L (ref 15–37)
BILIRUB SERPL-MCNC: 0.3 MG/DL (ref 0–1)
BUN BLDV-MCNC: 11 MG/DL (ref 7–20)
CALCIUM SERPL-MCNC: 10.1 MG/DL (ref 8.3–10.6)
CHLORIDE BLD-SCNC: 96 MMOL/L (ref 99–110)
CO2: 25 MMOL/L (ref 21–32)
CREAT SERPL-MCNC: 1 MG/DL (ref 0.9–1.3)
GFR AFRICAN AMERICAN: >60
GFR NON-AFRICAN AMERICAN: >60
GLUCOSE BLD-MCNC: 134 MG/DL (ref 70–99)
POTASSIUM SERPL-SCNC: 4 MMOL/L (ref 3.5–5.1)
SODIUM BLD-SCNC: 136 MMOL/L (ref 136–145)
TOTAL PROTEIN: 7.9 G/DL (ref 6.4–8.2)

## 2022-08-29 PROCEDURE — G8427 DOCREV CUR MEDS BY ELIG CLIN: HCPCS | Performed by: FAMILY MEDICINE

## 2022-08-29 PROCEDURE — G8420 CALC BMI NORM PARAMETERS: HCPCS | Performed by: FAMILY MEDICINE

## 2022-08-29 PROCEDURE — 99214 OFFICE O/P EST MOD 30 MIN: CPT | Performed by: FAMILY MEDICINE

## 2022-08-29 PROCEDURE — 36415 COLL VENOUS BLD VENIPUNCTURE: CPT | Performed by: FAMILY MEDICINE

## 2022-08-29 PROCEDURE — 1036F TOBACCO NON-USER: CPT | Performed by: FAMILY MEDICINE

## 2022-08-29 RX ORDER — ALLOPURINOL 300 MG/1
300 TABLET ORAL WEEKLY
Qty: 12 TABLET | Refills: 3 | Status: SHIPPED | OUTPATIENT
Start: 2022-08-29

## 2022-08-29 RX ORDER — FOLIC ACID 1 MG/1
1 TABLET ORAL DAILY
Qty: 90 TABLET | Refills: 3 | Status: SHIPPED | OUTPATIENT
Start: 2022-08-29

## 2022-08-29 RX ORDER — MULTIVIT-MIN/IRON FUM/FOLIC AC 7.5 MG-4
1 TABLET ORAL DAILY
Qty: 90 TABLET | Refills: 3 | Status: SHIPPED | OUTPATIENT
Start: 2022-08-29

## 2022-08-29 NOTE — PROGRESS NOTES
A/P:    Diagnosis Orders   1. Liver enzyme elevation  Comprehensive Metabolic Panel      2. Gouty arthritis        3. Dysfunctional alcohol use          For his liver enzyme elevation, will check CMP today. Alcohol cessation encouraged. He reports he will continue to cut back a bit at a time. He is to let me know if he would like referral to counseling, or any other resources to help him quit drinking altogether. He agrees to continue multivitamin, Y96, folic acid. His gout is controlled. He will continue allopurinol. Follow-up in 6 months or sooner if needed. O: /84   Pulse 92   Ht 5' 8\" (1.727 m)   Wt 155 lb (70.3 kg)   SpO2 98%   BMI 23.57 kg/m²    Gen- NAD, pleasant  HEENT- Eyes without icterus or injection  Neck- Supple, no lymphadenopathy appreciated  Lungs- CTAB  Heart-regular rate and rhythm  Abd- Soft, non tender  Ext- No edema  Psych- Appropriate    S: CC-checkup  HPI-patient presents for follow-up of his liver enzyme elevation, gout. He reports his gout is controlled. He reports he is cutting back on his alcohol use. He has pretty much eliminated any liquor and is just drinking beer. He is considering cutting back some more. He reports his gout remains completely controlled. He ports compliance with his vitamins.     ROS-denies fever, chills, night sweats  Denies chest pain, dyspnea, palpitations  Denies abdominal pain, nausea, vomiting, diarrhea    Patient's medications, allergies, and past medical hx were reviewed

## 2022-09-02 ENCOUNTER — TELEPHONE (OUTPATIENT)
Dept: FAMILY MEDICINE CLINIC | Age: 42
End: 2022-09-02

## 2022-09-02 NOTE — TELEPHONE ENCOUNTER
Please let Kitzmiller Summer know his liver enzymes are looking better. I would like him to continue cutting back on his alcohol use. I would like him to follow-up in 6 months.

## 2023-03-09 ENCOUNTER — HOSPITAL ENCOUNTER (INPATIENT)
Age: 43
LOS: 1 days | Discharge: HOME | End: 2023-03-10
Attending: EMERGENCY MEDICINE | Admitting: STUDENT IN AN ORGANIZED HEALTH CARE EDUCATION/TRAINING PROGRAM
Payer: COMMERCIAL

## 2023-03-09 ENCOUNTER — APPOINTMENT (OUTPATIENT)
Dept: CT IMAGING | Age: 43
End: 2023-03-09
Payer: COMMERCIAL

## 2023-03-09 DIAGNOSIS — E86.0 DEHYDRATION: ICD-10-CM

## 2023-03-09 DIAGNOSIS — E87.20 LACTIC ACIDOSIS: ICD-10-CM

## 2023-03-09 DIAGNOSIS — R20.0 NUMBNESS: Primary | ICD-10-CM

## 2023-03-09 DIAGNOSIS — R25.2 CRAMP AND SPASM: ICD-10-CM

## 2023-03-09 DIAGNOSIS — K52.9 ENTEROCOLITIS: ICD-10-CM

## 2023-03-09 PROCEDURE — 99285 EMERGENCY DEPT VISIT HI MDM: CPT

## 2023-03-09 PROCEDURE — 70450 CT HEAD/BRAIN W/O DYE: CPT

## 2023-03-09 PROCEDURE — 93005 ELECTROCARDIOGRAM TRACING: CPT | Performed by: PHYSICIAN ASSISTANT

## 2023-03-09 RX ORDER — LORAZEPAM 2 MG/ML
1 INJECTION INTRAMUSCULAR ONCE
Status: COMPLETED | OUTPATIENT
Start: 2023-03-10 | End: 2023-03-10

## 2023-03-09 RX ORDER — 0.9 % SODIUM CHLORIDE 0.9 %
1000 INTRAVENOUS SOLUTION INTRAVENOUS ONCE
Status: COMPLETED | OUTPATIENT
Start: 2023-03-10 | End: 2023-03-10

## 2023-03-09 RX ORDER — DIPHENHYDRAMINE HYDROCHLORIDE 50 MG/ML
25 INJECTION INTRAMUSCULAR; INTRAVENOUS ONCE
Status: COMPLETED | OUTPATIENT
Start: 2023-03-10 | End: 2023-03-10

## 2023-03-09 ASSESSMENT — LIFESTYLE VARIABLES: HOW OFTEN DO YOU HAVE A DRINK CONTAINING ALCOHOL: 4 OR MORE TIMES A WEEK

## 2023-03-09 ASSESSMENT — PAIN - FUNCTIONAL ASSESSMENT: PAIN_FUNCTIONAL_ASSESSMENT: 0-10

## 2023-03-09 ASSESSMENT — PAIN SCALES - GENERAL: PAINLEVEL_OUTOF10: 0

## 2023-03-10 ENCOUNTER — APPOINTMENT (OUTPATIENT)
Dept: GENERAL RADIOLOGY | Age: 43
End: 2023-03-10
Payer: COMMERCIAL

## 2023-03-10 ENCOUNTER — APPOINTMENT (OUTPATIENT)
Dept: CT IMAGING | Age: 43
End: 2023-03-10
Payer: COMMERCIAL

## 2023-03-10 VITALS
RESPIRATION RATE: 20 BRPM | DIASTOLIC BLOOD PRESSURE: 93 MMHG | HEART RATE: 117 BPM | OXYGEN SATURATION: 98 % | WEIGHT: 154.76 LBS | SYSTOLIC BLOOD PRESSURE: 141 MMHG | TEMPERATURE: 98.4 F | BODY MASS INDEX: 23.53 KG/M2

## 2023-03-10 PROBLEM — F10.20 ALCOHOLISM (HCC): Status: ACTIVE | Noted: 2023-03-10

## 2023-03-10 PROBLEM — M62.838 MUSCLE SPASM: Status: ACTIVE | Noted: 2023-03-10

## 2023-03-10 PROBLEM — A41.9 SEPSIS (HCC): Status: ACTIVE | Noted: 2023-03-10

## 2023-03-10 PROBLEM — K21.00 GERD WITH ESOPHAGITIS: Status: ACTIVE | Noted: 2023-03-10

## 2023-03-10 PROBLEM — K52.9 COLITIS: Status: ACTIVE | Noted: 2023-03-10

## 2023-03-10 PROBLEM — A41.9 SEPSIS (HCC): Status: RESOLVED | Noted: 2023-03-10 | Resolved: 2023-03-10

## 2023-03-10 LAB
A/G RATIO: 1.3 (ref 1.1–2.2)
A/G RATIO: 1.5 (ref 1.1–2.2)
ALBUMIN SERPL-MCNC: 4.5 G/DL (ref 3.4–5)
ALBUMIN SERPL-MCNC: 5.2 G/DL (ref 3.4–5)
ALP BLD-CCNC: 54 U/L (ref 40–129)
ALP BLD-CCNC: 67 U/L (ref 40–129)
ALT SERPL-CCNC: 48 U/L (ref 10–40)
ALT SERPL-CCNC: 61 U/L (ref 10–40)
AMPHETAMINE SCREEN, URINE: NORMAL
ANION GAP SERPL CALCULATED.3IONS-SCNC: 16 MMOL/L (ref 3–16)
ANION GAP SERPL CALCULATED.3IONS-SCNC: 24 MMOL/L (ref 3–16)
AST SERPL-CCNC: 40 U/L (ref 15–37)
AST SERPL-CCNC: 54 U/L (ref 15–37)
BACTERIA: NORMAL /HPF
BARBITURATE SCREEN URINE: NORMAL
BASOPHILS ABSOLUTE: 0 K/UL (ref 0–0.2)
BASOPHILS ABSOLUTE: 0 K/UL (ref 0–0.2)
BASOPHILS RELATIVE PERCENT: 0.1 %
BASOPHILS RELATIVE PERCENT: 0.1 %
BENZODIAZEPINE SCREEN, URINE: NORMAL
BILIRUB SERPL-MCNC: 0.5 MG/DL (ref 0–1)
BILIRUB SERPL-MCNC: 0.7 MG/DL (ref 0–1)
BILIRUBIN URINE: NEGATIVE
BLOOD, URINE: ABNORMAL
BUN BLDV-MCNC: 10 MG/DL (ref 7–20)
BUN BLDV-MCNC: 9 MG/DL (ref 7–20)
C DIFF TOXIN/ANTIGEN: NORMAL
CALCIUM SERPL-MCNC: 10.6 MG/DL (ref 8.3–10.6)
CALCIUM SERPL-MCNC: 8.5 MG/DL (ref 8.3–10.6)
CANNABINOID SCREEN URINE: NORMAL
CHLORIDE BLD-SCNC: 102 MMOL/L (ref 99–110)
CHLORIDE BLD-SCNC: 96 MMOL/L (ref 99–110)
CLARITY: CLEAR
CO2: 18 MMOL/L (ref 21–32)
CO2: 19 MMOL/L (ref 21–32)
COCAINE METABOLITE SCREEN URINE: NORMAL
COLOR: YELLOW
CREAT SERPL-MCNC: 0.8 MG/DL (ref 0.9–1.3)
CREAT SERPL-MCNC: 0.9 MG/DL (ref 0.9–1.3)
EKG ATRIAL RATE: 133 BPM
EKG DIAGNOSIS: NORMAL
EKG P-R INTERVAL: 104 MS
EKG Q-T INTERVAL: 384 MS
EKG QRS DURATION: 78 MS
EKG QTC CALCULATION (BAZETT): 571 MS
EKG R AXIS: 77 DEGREES
EKG T AXIS: 78 DEGREES
EKG VENTRICULAR RATE: 133 BPM
EOSINOPHILS ABSOLUTE: 0 K/UL (ref 0–0.6)
EOSINOPHILS ABSOLUTE: 0 K/UL (ref 0–0.6)
EOSINOPHILS RELATIVE PERCENT: 0.1 %
EOSINOPHILS RELATIVE PERCENT: 0.1 %
EPITHELIAL CELLS, UA: 0 /HPF (ref 0–5)
ETHANOL: NORMAL MG/DL (ref 0–0.08)
FENTANYL SCREEN, URINE: NORMAL
GFR SERPL CREATININE-BSD FRML MDRD: >60 ML/MIN/{1.73_M2}
GFR SERPL CREATININE-BSD FRML MDRD: >60 ML/MIN/{1.73_M2}
GLUCOSE BLD-MCNC: 125 MG/DL (ref 70–99)
GLUCOSE BLD-MCNC: 126 MG/DL (ref 70–99)
GLUCOSE URINE: NEGATIVE MG/DL
HCT VFR BLD CALC: 38.4 % (ref 40.5–52.5)
HCT VFR BLD CALC: 43.5 % (ref 40.5–52.5)
HEMOGLOBIN: 13.1 G/DL (ref 13.5–17.5)
HEMOGLOBIN: 15 G/DL (ref 13.5–17.5)
HYALINE CASTS: 1 /LPF (ref 0–8)
KETONES, URINE: >=80 MG/DL
LACTIC ACID: 2 MMOL/L (ref 0.4–2)
LACTIC ACID: 5.5 MMOL/L (ref 0.4–2)
LEUKOCYTE ESTERASE, URINE: NEGATIVE
LYMPHOCYTES ABSOLUTE: 0.3 K/UL (ref 1–5.1)
LYMPHOCYTES ABSOLUTE: 0.8 K/UL (ref 1–5.1)
LYMPHOCYTES RELATIVE PERCENT: 2.8 %
LYMPHOCYTES RELATIVE PERCENT: 5.9 %
Lab: NORMAL
MAGNESIUM: 1.7 MG/DL (ref 1.8–2.4)
MAGNESIUM: 1.9 MG/DL (ref 1.8–2.4)
MCH RBC QN AUTO: 32 PG (ref 26–34)
MCH RBC QN AUTO: 32.4 PG (ref 26–34)
MCHC RBC AUTO-ENTMCNC: 34 G/DL (ref 31–36)
MCHC RBC AUTO-ENTMCNC: 34.5 G/DL (ref 31–36)
MCV RBC AUTO: 93 FL (ref 80–100)
MCV RBC AUTO: 95.3 FL (ref 80–100)
METHADONE SCREEN, URINE: NORMAL
MICROSCOPIC EXAMINATION: YES
MONOCYTES ABSOLUTE: 0.3 K/UL (ref 0–1.3)
MONOCYTES ABSOLUTE: 0.4 K/UL (ref 0–1.3)
MONOCYTES RELATIVE PERCENT: 2.6 %
MONOCYTES RELATIVE PERCENT: 3.2 %
NEUTROPHILS ABSOLUTE: 10.1 K/UL (ref 1.7–7.7)
NEUTROPHILS ABSOLUTE: 12.3 K/UL (ref 1.7–7.7)
NEUTROPHILS RELATIVE PERCENT: 90.7 %
NEUTROPHILS RELATIVE PERCENT: 94.4 %
NITRITE, URINE: NEGATIVE
OPIATE SCREEN URINE: NORMAL
OXYCODONE URINE: NORMAL
PDW BLD-RTO: 12.1 % (ref 12.4–15.4)
PDW BLD-RTO: 12.2 % (ref 12.4–15.4)
PH UA: 6
PH UA: 6 (ref 5–8)
PHENCYCLIDINE SCREEN URINE: NORMAL
PLATELET # BLD: 224 K/UL (ref 135–450)
PLATELET # BLD: 278 K/UL (ref 135–450)
PMV BLD AUTO: 10 FL (ref 5–10.5)
PMV BLD AUTO: 9.8 FL (ref 5–10.5)
POTASSIUM SERPL-SCNC: 3.9 MMOL/L (ref 3.5–5.1)
POTASSIUM SERPL-SCNC: 4.2 MMOL/L (ref 3.5–5.1)
PROTEIN UA: 30 MG/DL
RBC # BLD: 4.03 M/UL (ref 4.2–5.9)
RBC # BLD: 4.68 M/UL (ref 4.2–5.9)
RBC UA: 1 /HPF (ref 0–4)
SARS-COV-2, NAAT: NOT DETECTED
SODIUM BLD-SCNC: 137 MMOL/L (ref 136–145)
SODIUM BLD-SCNC: 138 MMOL/L (ref 136–145)
SPECIFIC GRAVITY UA: 1.02 (ref 1–1.03)
TOTAL CK: 328 U/L (ref 39–308)
TOTAL PROTEIN: 7.6 G/DL (ref 6.4–8.2)
TOTAL PROTEIN: 9.2 G/DL (ref 6.4–8.2)
TROPONIN: <0.01 NG/ML
URINE REFLEX TO CULTURE: ABNORMAL
URINE TYPE: ABNORMAL
UROBILINOGEN, URINE: 0.2 E.U./DL
WBC # BLD: 10.7 K/UL (ref 4–11)
WBC # BLD: 13.5 K/UL (ref 4–11)
WBC UA: 1 /HPF (ref 0–5)

## 2023-03-10 PROCEDURE — 96375 TX/PRO/DX INJ NEW DRUG ADDON: CPT

## 2023-03-10 PROCEDURE — 2580000003 HC RX 258: Performed by: STUDENT IN AN ORGANIZED HEALTH CARE EDUCATION/TRAINING PROGRAM

## 2023-03-10 PROCEDURE — 82550 ASSAY OF CK (CPK): CPT

## 2023-03-10 PROCEDURE — 6360000004 HC RX CONTRAST MEDICATION: Performed by: PHYSICIAN ASSISTANT

## 2023-03-10 PROCEDURE — 93010 ELECTROCARDIOGRAM REPORT: CPT | Performed by: INTERNAL MEDICINE

## 2023-03-10 PROCEDURE — 83735 ASSAY OF MAGNESIUM: CPT

## 2023-03-10 PROCEDURE — 71045 X-RAY EXAM CHEST 1 VIEW: CPT

## 2023-03-10 PROCEDURE — 2060000000 HC ICU INTERMEDIATE R&B

## 2023-03-10 PROCEDURE — 87449 NOS EACH ORGANISM AG IA: CPT

## 2023-03-10 PROCEDURE — 6370000000 HC RX 637 (ALT 250 FOR IP): Performed by: STUDENT IN AN ORGANIZED HEALTH CARE EDUCATION/TRAINING PROGRAM

## 2023-03-10 PROCEDURE — 87040 BLOOD CULTURE FOR BACTERIA: CPT

## 2023-03-10 PROCEDURE — 80307 DRUG TEST PRSMV CHEM ANLYZR: CPT

## 2023-03-10 PROCEDURE — 36415 COLL VENOUS BLD VENIPUNCTURE: CPT

## 2023-03-10 PROCEDURE — 6360000002 HC RX W HCPCS: Performed by: EMERGENCY MEDICINE

## 2023-03-10 PROCEDURE — 6360000002 HC RX W HCPCS: Performed by: STUDENT IN AN ORGANIZED HEALTH CARE EDUCATION/TRAINING PROGRAM

## 2023-03-10 PROCEDURE — 80053 COMPREHEN METABOLIC PANEL: CPT

## 2023-03-10 PROCEDURE — 96365 THER/PROPH/DIAG IV INF INIT: CPT

## 2023-03-10 PROCEDURE — 2580000003 HC RX 258: Performed by: PHYSICIAN ASSISTANT

## 2023-03-10 PROCEDURE — 82077 ASSAY SPEC XCP UR&BREATH IA: CPT

## 2023-03-10 PROCEDURE — 83605 ASSAY OF LACTIC ACID: CPT

## 2023-03-10 PROCEDURE — 2500000003 HC RX 250 WO HCPCS: Performed by: EMERGENCY MEDICINE

## 2023-03-10 PROCEDURE — 87635 SARS-COV-2 COVID-19 AMP PRB: CPT

## 2023-03-10 PROCEDURE — 2500000003 HC RX 250 WO HCPCS: Performed by: STUDENT IN AN ORGANIZED HEALTH CARE EDUCATION/TRAINING PROGRAM

## 2023-03-10 PROCEDURE — 81001 URINALYSIS AUTO W/SCOPE: CPT

## 2023-03-10 PROCEDURE — 85025 COMPLETE CBC W/AUTO DIFF WBC: CPT

## 2023-03-10 PROCEDURE — 74177 CT ABD & PELVIS W/CONTRAST: CPT

## 2023-03-10 PROCEDURE — 84484 ASSAY OF TROPONIN QUANT: CPT

## 2023-03-10 PROCEDURE — 87324 CLOSTRIDIUM AG IA: CPT

## 2023-03-10 PROCEDURE — 71260 CT THORAX DX C+: CPT | Performed by: PHYSICIAN ASSISTANT

## 2023-03-10 RX ORDER — CALCIUM CARBONATE 200(500)MG
500 TABLET,CHEWABLE ORAL ONCE
Status: COMPLETED | OUTPATIENT
Start: 2023-03-10 | End: 2023-03-10

## 2023-03-10 RX ORDER — SODIUM CHLORIDE 0.9 % (FLUSH) 0.9 %
5-40 SYRINGE (ML) INJECTION EVERY 12 HOURS SCHEDULED
Status: DISCONTINUED | OUTPATIENT
Start: 2023-03-10 | End: 2023-03-10 | Stop reason: HOSPADM

## 2023-03-10 RX ORDER — SODIUM CHLORIDE 0.9 % (FLUSH) 0.9 %
5-40 SYRINGE (ML) INJECTION PRN
Status: DISCONTINUED | OUTPATIENT
Start: 2023-03-10 | End: 2023-03-10 | Stop reason: SDUPTHER

## 2023-03-10 RX ORDER — CIPROFLOXACIN 2 MG/ML
400 INJECTION, SOLUTION INTRAVENOUS ONCE
Status: COMPLETED | OUTPATIENT
Start: 2023-03-10 | End: 2023-03-10

## 2023-03-10 RX ORDER — PANTOPRAZOLE SODIUM 20 MG/1
40 TABLET, DELAYED RELEASE ORAL DAILY
Qty: 30 TABLET | Refills: 3 | Status: SHIPPED | OUTPATIENT
Start: 2023-03-10

## 2023-03-10 RX ORDER — METRONIDAZOLE 500 MG/100ML
500 INJECTION, SOLUTION INTRAVENOUS ONCE
Status: COMPLETED | OUTPATIENT
Start: 2023-03-10 | End: 2023-03-10

## 2023-03-10 RX ORDER — ALLOPURINOL 300 MG/1
300 TABLET ORAL WEEKLY
Status: DISCONTINUED | OUTPATIENT
Start: 2023-03-13 | End: 2023-03-10 | Stop reason: HOSPADM

## 2023-03-10 RX ORDER — 0.9 % SODIUM CHLORIDE 0.9 %
1000 INTRAVENOUS SOLUTION INTRAVENOUS ONCE
Status: DISCONTINUED | OUTPATIENT
Start: 2023-03-10 | End: 2023-03-10 | Stop reason: HOSPADM

## 2023-03-10 RX ORDER — ACETAMINOPHEN 650 MG/1
650 SUPPOSITORY RECTAL EVERY 6 HOURS PRN
Status: DISCONTINUED | OUTPATIENT
Start: 2023-03-10 | End: 2023-03-10 | Stop reason: HOSPADM

## 2023-03-10 RX ORDER — CIPROFLOXACIN 2 MG/ML
400 INJECTION, SOLUTION INTRAVENOUS EVERY 12 HOURS
Status: DISCONTINUED | OUTPATIENT
Start: 2023-03-10 | End: 2023-03-10 | Stop reason: HOSPADM

## 2023-03-10 RX ORDER — SODIUM CHLORIDE 0.9 % (FLUSH) 0.9 %
5-40 SYRINGE (ML) INJECTION EVERY 12 HOURS SCHEDULED
Status: DISCONTINUED | OUTPATIENT
Start: 2023-03-10 | End: 2023-03-10 | Stop reason: SDUPTHER

## 2023-03-10 RX ORDER — SODIUM CHLORIDE 9 MG/ML
INJECTION, SOLUTION INTRAVENOUS PRN
Status: DISCONTINUED | OUTPATIENT
Start: 2023-03-10 | End: 2023-03-10 | Stop reason: HOSPADM

## 2023-03-10 RX ORDER — LANOLIN ALCOHOL/MO/W.PET/CERES
1000 CREAM (GRAM) TOPICAL DAILY
Status: DISCONTINUED | OUTPATIENT
Start: 2023-03-10 | End: 2023-03-10 | Stop reason: HOSPADM

## 2023-03-10 RX ORDER — GAUZE BANDAGE 2" X 2"
100 BANDAGE TOPICAL DAILY
Status: DISCONTINUED | OUTPATIENT
Start: 2023-03-10 | End: 2023-03-10 | Stop reason: HOSPADM

## 2023-03-10 RX ORDER — ONDANSETRON 2 MG/ML
4 INJECTION INTRAMUSCULAR; INTRAVENOUS EVERY 6 HOURS PRN
Status: DISCONTINUED | OUTPATIENT
Start: 2023-03-10 | End: 2023-03-10 | Stop reason: HOSPADM

## 2023-03-10 RX ORDER — AZITHROMYCIN 500 MG/1
500 TABLET, FILM COATED ORAL DAILY
Qty: 3 TABLET | Refills: 0 | Status: SHIPPED | OUTPATIENT
Start: 2023-03-10 | End: 2023-03-13

## 2023-03-10 RX ORDER — ACETAMINOPHEN 325 MG/1
650 TABLET ORAL EVERY 6 HOURS PRN
Status: DISCONTINUED | OUTPATIENT
Start: 2023-03-10 | End: 2023-03-10 | Stop reason: HOSPADM

## 2023-03-10 RX ORDER — SODIUM CHLORIDE 0.9 % (FLUSH) 0.9 %
5-40 SYRINGE (ML) INJECTION PRN
Status: DISCONTINUED | OUTPATIENT
Start: 2023-03-10 | End: 2023-03-10 | Stop reason: HOSPADM

## 2023-03-10 RX ORDER — METRONIDAZOLE 500 MG/100ML
500 INJECTION, SOLUTION INTRAVENOUS EVERY 8 HOURS
Status: DISCONTINUED | OUTPATIENT
Start: 2023-03-10 | End: 2023-03-10 | Stop reason: HOSPADM

## 2023-03-10 RX ORDER — FOLIC ACID 1 MG/1
1 TABLET ORAL DAILY
Status: DISCONTINUED | OUTPATIENT
Start: 2023-03-10 | End: 2023-03-10 | Stop reason: HOSPADM

## 2023-03-10 RX ORDER — MULTIVITAMIN WITH IRON
1 TABLET ORAL DAILY
Status: DISCONTINUED | OUTPATIENT
Start: 2023-03-10 | End: 2023-03-10 | Stop reason: HOSPADM

## 2023-03-10 RX ORDER — MORPHINE SULFATE 2 MG/ML
2 INJECTION, SOLUTION INTRAMUSCULAR; INTRAVENOUS EVERY 4 HOURS PRN
Status: DISCONTINUED | OUTPATIENT
Start: 2023-03-10 | End: 2023-03-10 | Stop reason: HOSPADM

## 2023-03-10 RX ORDER — SODIUM CHLORIDE 9 MG/ML
INJECTION, SOLUTION INTRAVENOUS PRN
Status: DISCONTINUED | OUTPATIENT
Start: 2023-03-10 | End: 2023-03-10 | Stop reason: SDUPTHER

## 2023-03-10 RX ORDER — SODIUM CHLORIDE 9 MG/ML
INJECTION, SOLUTION INTRAVENOUS CONTINUOUS
Status: DISCONTINUED | OUTPATIENT
Start: 2023-03-10 | End: 2023-03-10 | Stop reason: HOSPADM

## 2023-03-10 RX ORDER — ENOXAPARIN SODIUM 100 MG/ML
40 INJECTION SUBCUTANEOUS DAILY
Status: DISCONTINUED | OUTPATIENT
Start: 2023-03-10 | End: 2023-03-10 | Stop reason: HOSPADM

## 2023-03-10 RX ADMIN — SODIUM CHLORIDE 1000 ML: 9 INJECTION, SOLUTION INTRAVENOUS at 00:31

## 2023-03-10 RX ADMIN — LORAZEPAM 1 MG: 2 INJECTION INTRAMUSCULAR; INTRAVENOUS at 00:31

## 2023-03-10 RX ADMIN — METRONIDAZOLE 500 MG: 500 INJECTION, SOLUTION INTRAVENOUS at 10:19

## 2023-03-10 RX ADMIN — IOPAMIDOL 75 ML: 755 INJECTION, SOLUTION INTRAVENOUS at 01:20

## 2023-03-10 RX ADMIN — DIPHENHYDRAMINE HYDROCHLORIDE 25 MG: 50 INJECTION, SOLUTION INTRAMUSCULAR; INTRAVENOUS at 00:31

## 2023-03-10 RX ADMIN — ENOXAPARIN SODIUM 40 MG: 100 INJECTION SUBCUTANEOUS at 10:20

## 2023-03-10 RX ADMIN — CYANOCOBALAMIN TAB 1000 MCG 1000 MCG: 1000 TAB at 10:03

## 2023-03-10 RX ADMIN — ACETAMINOPHEN 650 MG: 325 TABLET ORAL at 05:51

## 2023-03-10 RX ADMIN — ANTACID TABLETS 500 MG: 500 TABLET, CHEWABLE ORAL at 05:51

## 2023-03-10 RX ADMIN — FOLIC ACID 1 MG: 1 TABLET ORAL at 10:03

## 2023-03-10 RX ADMIN — METRONIDAZOLE 500 MG: 500 INJECTION, SOLUTION INTRAVENOUS at 02:13

## 2023-03-10 RX ADMIN — SODIUM CHLORIDE: 9 INJECTION, SOLUTION INTRAVENOUS at 05:10

## 2023-03-10 RX ADMIN — Medication 100 MG: at 10:03

## 2023-03-10 RX ADMIN — ONDANSETRON 4 MG: 2 INJECTION INTRAMUSCULAR; INTRAVENOUS at 05:58

## 2023-03-10 RX ADMIN — CIPROFLOXACIN 400 MG: 2 INJECTION, SOLUTION INTRAVENOUS at 03:32

## 2023-03-10 RX ADMIN — THERA TABS 1 TABLET: TAB at 10:03

## 2023-03-10 ASSESSMENT — ENCOUNTER SYMPTOMS
BACK PAIN: 0
EYE PAIN: 0
EYE REDNESS: 0
BLOOD IN STOOL: 0
TROUBLE SWALLOWING: 0
CHOKING: 0
SORE THROAT: 0
NAUSEA: 1
PHOTOPHOBIA: 0
APNEA: 0
EYE ITCHING: 0
CHEST TIGHTNESS: 0
COLOR CHANGE: 0
FACIAL SWELLING: 0
RECTAL PAIN: 0
SHORTNESS OF BREATH: 0
ABDOMINAL DISTENTION: 0
EYE DISCHARGE: 0
DIARRHEA: 1
ABDOMINAL PAIN: 0
CONSTIPATION: 0
VOMITING: 1
ANAL BLEEDING: 0
COUGH: 0
STRIDOR: 0
WHEEZING: 0

## 2023-03-10 ASSESSMENT — PAIN SCALES - GENERAL: PAINLEVEL_OUTOF10: 0

## 2023-03-10 NOTE — PROGRESS NOTES
Discharge instructions reviewed with pt/family, discussed medications, VU, denies any further questions or anxiety related to discharge. Educational handouts in regards to new medications, given via [employer], side effects discussed, VU. IV/tele discontinued. Pt discharged to home with brother. Taken from room via wc by brother, personal belongings taken with.

## 2023-03-10 NOTE — ED NOTES
Cr lactic 5.5      Sydnie Mcintyre RN  03/10/23 0102 patient exposed to carbon monoxide poisoning at work with co-workers.

## 2023-03-10 NOTE — PLAN OF CARE
Pain/discomfort being managed with PRN analgesics per MD orders. Pt able to express presence and absence of pain and rate pain appropriately using numerical scale. No intake or output data in the 24 hours ending 03/10/23 1325  Vitals:    03/10/23 0936   BP: 118/73   Pulse:    Resp:    Temp: 98.4 °F (36.9 °C)   SpO2:      Patient assessed for fall risk; fall precautions initiated. Patient and family instructed about safety devices. Environment kept free of clutter and adequate lighting provided. Bed locked and in lowest position. Call light within reach. Will continue to monitor.

## 2023-03-10 NOTE — PLAN OF CARE
Problem: Safety - Adult  Goal: Free from fall injury  Outcome: Progressing  Flowsheets (Taken 3/10/2023 7418)  Free From Fall Injury:   Instruct family/caregiver on patient safety   Based on caregiver fall risk screen, instruct family/caregiver to ask for assistance with transferring infant if caregiver noted to have fall risk factors

## 2023-03-10 NOTE — PROGRESS NOTES
Patient admitted to room 5273 from ED. Patient oriented to room, call light, bed rails, phone, lights and bathroom. Patient instructed about the schedule of the day including: vital sign frequency, lab draws, possible tests, frequency of MD and staff rounds, daily weights, I &O's and prescribed diet. Telemetry box in place, patient aware of placement and reason. Bed locked, in lowest position, side rails up 2/4, call light within reach. 4 Eyes Skin Assessment     The patient is being assess for   Admission    I agree that 2 RN's have performed a thorough Head to Toe Skin Assessment on the patient. ALL assessment sites listed below have been assessed. Areas assessed for pressure by both nurses:   [x]   Head, Face, and Ears   [x]   Shoulders, Back, and Chest, Abdomen  [x]   Arms, Elbows, and Hands   [x]   Coccyx, Sacrum, and Ischium  [x]   Legs, Feet, and Heels         **SHARE this note so that the co-signing nurse is able to place an eSignature**    Co-signer eSignature: Electronically signed by Laura Curtis RN on 3/10/23 at 7:24 AM EST    Does the Patient have Skin Breakdown related to pressure?   No          Sha Prevention initiated:  NA   Wound Care Orders initiated:  NA      [facility] nurse consulted for Pressure Injury (Stage 3,4, Unstageable, DTI, NWPT, Complex wounds)and New or Established Ostomies:  NA      Primary Nurse eSignature: Electronically signed by Deirdre Garcia RN on 3/10/23 at 4:33 AM EST

## 2023-03-10 NOTE — ED PROVIDER NOTES
[address]        Pt Name: Tod Mendoza  MRN: [id number]  [place] 1980  Date of evaluation: 3/9/2023  Provider: PATRICK Acuña  PCP: Jayson Betancur DO  Note Started: 3:01 AM EST 3/10/23       I have seen and evaluated this patient with my supervising physician Dr. Anh Mckenna. CHIEF COMPLAINT       Chief Complaint   Patient presents with    Numbness     Pt states that he has been having jaw stiffness and jaw pain for the last 30 minutes. Pt states that he has been experiencing left sided arm numbness. Pt states that he has been vomiting all day and has been experiencing diarrhea. Pt denies taking blood thinners. Pt states that he has been experiencing chest pain for the last 30 minutes. HISTORY OF PRESENT ILLNESS: 1 or more Elements     History from : Patient and Dad    Limitations to history : None    Tod Mendoza is a 36 y.o. male who presents via EMS. He tells me has been sick today with nausea vomiting diarrhea. Dad states that he and the patient's mother have also had some vomiting diarrhea over the past couple of days. He tells me about 30 minutes ago his face locked up his entire face feels numb both of his hands feel numb he has pain in both sides of his jaw. He feels like he cannot open his mouth. He had 6 episodes of vomiting today reportedly. He does alcohol regularly and had some wine today. Denies chronic medical problems otherwise. Nursing Notes were all reviewed and agreed with or any disagreements were addressed in the HPI. REVIEW OF SYSTEMS :      Review of Systems   Constitutional:  Negative for fever. HENT:  Negative for drooling, facial swelling (pain), sore throat and trouble swallowing. Respiratory:  Negative for shortness of breath. Cardiovascular:  Positive for chest pain. Gastrointestinal:  Positive for diarrhea, nausea and vomiting.  Negative for abdominal pain and constipation. Musculoskeletal:  Negative for back pain and joint swelling. Skin:  Negative for color change, rash and wound. Neurological:  Positive for numbness and headaches. Negative for weakness. Psychiatric/Behavioral:  Negative for agitation, behavioral problems and confusion. Positives and Pertinent negatives as per HPI. SURGICAL HISTORY   History reviewed. No pertinent surgical history. CURRENTMEDICATIONS       Previous Medications    ALLOPURINOL (ZYLOPRIM) 300 MG TABLET    Take 1 tablet by mouth once a week    CYANOCOBALAMIN ([facility] VITAMIN B12) 1000 MCG TABLET    Take 1 tablet by mouth daily    FOLIC ACID (FOLVITE) 1 MG TABLET    Take 1 tablet by mouth daily    MULTIPLE VITAMINS-MINERALS (MULTIVITAMIN WITH MINERALS) TABLET    Take 1 tablet by mouth daily       ALLERGIES     Patient has no known allergies. FAMILYHISTORY       Family History   Problem Relation Age of Onset    No Known Problems Mother     High Blood Pressure Father     High Cholesterol Father     Diabetes Father     No Known Problems Brother     Cancer Paternal Grandfather         thinks colon        SOCIAL HISTORY       Social History     Tobacco Use    Smoking status: Former     Packs/day: 0.50     Types: Cigarettes     Quit date: 1/1/2013     Years since quitting: 10.1    Smokeless tobacco: Never   Vaping Use    Vaping Use: Never used   Substance Use Topics    Alcohol use:  Yes     Alcohol/week: 4.0 standard drinks     Types: 4 Cans of beer per week     Comment: social    Drug use: No       SCREENINGS        Tamie Coma Scale  Eye Opening: Spontaneous  Best Verbal Response: Oriented  Best Motor Response: Obeys commands  Tamie Coma Scale Score: 15                CIWA Assessment  BP: 133/81  Heart Rate: (!) 104           PHYSICAL EXAM  1 or more Elements     ED Triage Vitals   BP Temp Temp Source Heart Rate Resp SpO2 Height Weight   03/09/23 2347 03/09/23 2347 03/09/23 2347 03/09/23 2347 03/09/23 2347 03/09/23 2347 -- 03/10/23 0237   (!) 153/96 98.4 °F (36.9 °C) Temporal (!) 130 18 100 %  154 lb 12.2 oz (70.2 kg)       Physical Exam  Vitals and nursing note reviewed. Constitutional:       Appearance: Normal appearance. HENT:      Head: Normocephalic and atraumatic. Jaw: Trismus (Resolved) present. Mouth/Throat:      Mouth: Mucous membranes are moist.   Eyes:      Pupils: Pupils are equal, round, and reactive to light. Cardiovascular:      Rate and Rhythm: Normal rate. Pulmonary:      Effort: Pulmonary effort is normal. No respiratory distress. Abdominal:      Tenderness: There is abdominal tenderness. Musculoskeletal:      Cervical back: Normal range of motion. Neurological:      Mental Status: He is alert. GCS: GCS eye subscore is 4. GCS verbal subscore is 5. GCS motor subscore is 6. Psychiatric:         Mood and Affect: Mood is anxious. Behavior: Behavior is agitated. Cognition and Memory: Cognition is not impaired. Memory is not impaired. DIAGNOSTIC RESULTS   LABS:    Labs Reviewed   CBC WITH AUTO DIFFERENTIAL - Abnormal; Notable for the following components:       Result Value    WBC 13.5 (*)     RDW 12.1 (*)     Neutrophils Absolute 12.3 (*)     Lymphocytes Absolute 0.8 (*)     All other components within normal limits   COMPREHENSIVE METABOLIC PANEL - Abnormal; Notable for the following components:    Chloride 96 (*)     CO2 18 (*)     Anion Gap 24 (*)     Glucose 125 (*)     Creatinine 0.8 (*)     Total Protein 9.2 (*)     Albumin 5.2 (*)     ALT 61 (*)     AST 54 (*)     All other components within normal limits   CK - Abnormal; Notable for the following components:     Total  (*)     All other components within normal limits   URINALYSIS WITH REFLEX TO CULTURE - Abnormal; Notable for the following components:    Ketones, Urine >=80 (*)     Blood, Urine TRACE-INTACT (*)     Protein, UA 30 (*)     All other components within normal limits   LACTIC ACID - Abnormal; Notable for the following components:    Lactic Acid 5.5 (*)     All other components within normal limits    Narrative:     Collette Pillow tel. [phone],  Chemistry results called to and read back by Cristhian Aquino RN, 03/10/2023  01:02, by BLUSH   COVID-19, RAPID   CULTURE, BLOOD 1   CULTURE, BLOOD 2   MAGNESIUM   TROPONIN   URINE DRUG SCREEN   ETHANOL   MICROSCOPIC URINALYSIS   LACTIC ACID       When ordered only abnormal lab results are displayed. All other labs were within normal range or not returned as of this dictation. EKG: When ordered, EKG's are interpreted by the Emergency Department Physician in the absence of a cardiologist.  Please see their note for interpretation of EKG. RADIOLOGY:   Non-plain film images such as CT, Ultrasound and MRI are read by the radiologist. Plain radiographic images are visualized and preliminarily interpreted by the ED Provider with the below findings:    Interpretation per the Radiologist below, if available at the time of this note:    CT ABDOMEN PELVIS W IV CONTRAST Additional Contrast? None   Final Result   1. Fluid-filled but nondilated loops of small bowel and fluid-filled colon. Questionable thickening of the wall and several loops of small bowel in the   right lower quadrant but could be from the under distended state. Overall   features still indicate enterocolitis and diarrhea. 2.  No bowel obstruction, abscess, or pneumoperitoneum. 3.  Diffuse fatty metamorphosis of the liver. 4.  Mild concentric thickening in the distal esophagus and correlate for   esophagitis. CT CHEST PULMONARY EMBOLISM W CONTRAST   Final Result   1. No pulmonary embolism. 2. The lungs are clear. 3. Severe mucosal thickening of the distal esophagus suggesting underlying   esophagitis. 4. Hepatomegaly and diffuse steatosis. XR CHEST PORTABLE   Final Result   1. No evidence of acute cardiopulmonary disease.          CT HEAD WO CONTRAST Final Result   No acute intracranial abnormality. CT HEAD WO CONTRAST    Result Date: 3/10/2023  EXAMINATION: CT OF THE HEAD WITHOUT CONTRAST  3/9/2023 11:54 pm TECHNIQUE: CT of the head was performed without the administration of intravenous contrast. Automated exposure control, iterative reconstruction, and/or weight based adjustment of the mA/kV was utilized to reduce the radiation dose to as low as reasonably achievable. COMPARISON: None. HISTORY: ORDERING SYSTEM PROVIDED HISTORY: headache TECHNOLOGIST PROVIDED HISTORY: Has a \"code stroke\" or \"stroke alert\" been called? ->No Reason for exam:->headache Reason for Exam: headache FINDINGS: BRAIN/VENTRICLES: There is no acute intracranial hemorrhage, mass effect or midline shift. No abnormal extra-axial fluid collection. The gray-white differentiation is maintained without evidence of an acute infarct. There is no evidence of hydrocephalus. ORBITS: The visualized portion of the orbits demonstrate no acute abnormality. SINUSES: The visualized paranasal sinuses and mastoid air cells demonstrate no acute abnormality. SOFT TISSUES/SKULL:  No acute abnormality of the visualized skull or soft tissues. No acute intracranial abnormality. CT ABDOMEN PELVIS W IV CONTRAST Additional Contrast? None    Result Date: 3/10/2023  EXAMINATION: CT OF THE ABDOMEN AND PELVIS WITH CONTRAST 3/10/2023 1:12 am TECHNIQUE: CT of the abdomen and pelvis was performed with the administration of intravenous contrast. Multiplanar reformatted images are provided for review. Automated exposure control, iterative reconstruction, and/or weight based adjustment of the mA/kV was utilized to reduce the radiation dose to as low as reasonably achievable. COMPARISON: None.  HISTORY: ORDERING SYSTEM PROVIDED HISTORY: abd pain, n/v diarrhea TECHNOLOGIST PROVIDED HISTORY: Additional Contrast?->None Reason for exam:->abd pain, n/v diarrhea Reason for Exam: abd pain, n/v diarrhea FINDINGS: Lower Chest: The lung bases are clear and no pleural effusion. There is mild concentric thickening of the wall of the distal esophagus and GE junction. Organs: Diffuse fatty metamorphosis of the liver without enhancing mass or surface nodularity. Mild sparing of the fatty metamorphosis along the gallbladder fossa. No mineralized stone or fat stranding at the gallbladder. The spleen is not enlarged. No pancreatic calcification, ductal dilatation, or surrounding fluid collection. The adrenal glands are unremarkable. The kidneys are enhancing equally and excreting contrast.  No hydronephrosis or hydroureter on either side. GI/Bowel: Mild fluid distension of the stomach. The duodenum and proximal small bowel are unremarkable. Fluid-filled loops of distal small bowel without dilatation. Questionable thickening of the distal small bowel wall could be real or due to the under distended state. The appendix is identified and no appendicitis. Fluid in the nondilated colon from the cecum all the way through the rectum. The colonic wall is not thickened and there is no pericolonic fat stranding. No abnormal fluid collection, ascites, or pneumoperitoneum. Pelvis: The urinary bladder is collapsed and the wall thickness is not accurately evaluated. Prostate is not enlarged. No free fluid in the pelvis. Peritoneum/Retroperitoneum: No abdominal aortic aneurysm or retroperitoneal hematoma. Small periaortic lymph nodes but not enlarged. Bones/Soft Tissues: Some scarring/small hernia at the umbilicus without acute complication. No acute fracture or destruction at the bones. 1.  Fluid-filled but nondilated loops of small bowel and fluid-filled colon. Questionable thickening of the wall and several loops of small bowel in the right lower quadrant but could be from the under distended state. Overall features still indicate enterocolitis and diarrhea. 2.  No bowel obstruction, abscess, or pneumoperitoneum.  3.  Diffuse fatty metamorphosis of the liver. 4.  Mild concentric thickening in the distal esophagus and correlate for esophagitis. XR CHEST PORTABLE    Result Date: 3/10/2023  EXAMINATION: ONE XRAY VIEW OF THE CHEST 3/9/2023 11:02 pm COMPARISON: None. HISTORY: ORDERING SYSTEM PROVIDED HISTORY: chest pain TECHNOLOGIST PROVIDED HISTORY: Reason for exam:->chest pain Reason for Exam: chest pain FINDINGS: LUNGS: Clear. HEART: Normal size and contour. MEDIASTINUM: Unremarkable. ROSALINA/PULMONARY VASCULATURE: Unremarkable. PLEURAL SPACES: No pleural effusion. No pneumothorax. BONES: No acute abnormality or worrisome osseous lesion. MISCELLANEOUS: Unremarkable. 1. No evidence of acute cardiopulmonary disease. CT CHEST PULMONARY EMBOLISM W CONTRAST    Result Date: 3/10/2023  EXAMINATION: CTA OF THE CHEST 3/10/2023 1:11 am TECHNIQUE: CTA of the chest was performed after the administration of intravenous contrast.  Multiplanar reformatted images are provided for review. MIP images are provided for review. Automated exposure control, iterative reconstruction, and/or weight based adjustment of the mA/kV was utilized to reduce the radiation dose to as low as reasonably achievable. COMPARISON: None. HISTORY: ORDERING SYSTEM PROVIDED HISTORY: chest pain, tachy TECHNOLOGIST PROVIDED HISTORY: Reason for exam:->chest pain, tachy Decision Support Exception - unselect if not a suspected or confirmed emergency medical condition->Emergency Medical Condition (MA) Reason for Exam: chest pain, tachy FINDINGS: Pulmonary Arteries: Study is of good technical quality for evaluation of pulmonary embolism. There are no filling defects to suggest pulmonary embolism. Main pulmonary artery is normal in caliber. No evidence of right ventricular strain. Mediastinum: The heart size is normal. Aorta is normal in caliber. Superior mediastinum is normal. No mediastinal or hilar lymph node enlargement. Lungs/pleura: Airways are patent.  No pleural fluid is present. No pneumothorax. The lungs are well expanded and clear. Upper Abdomen: Hepatomegaly with diffuse pattern of steatosis in the visualized abdomen. Normal adrenal glands. Severe mucosal thickening of the distal esophagus. Soft Tissues/Bones: No skeletal abnormalities throughout the chest.     1. No pulmonary embolism. 2. The lungs are clear. 3. Severe mucosal thickening of the distal esophagus suggesting underlying esophagitis. 4. Hepatomegaly and diffuse steatosis. No results found. PROCEDURES   Unless otherwise noted below, none     Procedures    CRITICAL CARE TIME (.cctime)   I performed a total Critical Care time of 15 minutes, excluding separately reportable procedures. There was a high probability of clinically significant/life threatening deterioration in the patient's condition which required my urgent intervention. Not limited to multiple reexaminations, discussions with attending physician and consultants. PAST MEDICAL HISTORY      has a past medical history of Bursitis of knee, COVID, Gout (2019), and Pneumothorax.      EMERGENCY DEPARTMENT COURSE and DIFFERENTIAL DIAGNOSIS/MDM:   Vitals:    Vitals:    03/09/23 2347 03/10/23 0030 03/10/23 0200 03/10/23 0237   BP: (!) 153/96 (!) 156/99 133/81    Pulse: (!) 130 (!) 124 (!) 104    Resp: 18 21 20    Temp: 98.4 °F (36.9 °C) 98.4 °F (36.9 °C) 98.4 °F (36.9 °C)    TempSrc: Temporal Oral Oral    SpO2: 100% 100% 100%    Weight:    154 lb 12.2 oz (70.2 kg)       Patient was given the following medications:  Medications   ciprofloxacin (CIPRO) IVPB 400 mg (has no administration in time range)   0.9 % sodium chloride bolus (has no administration in time range)   0.9 % sodium chloride infusion (has no administration in time range)   0.9 % sodium chloride bolus (1,000 mLs IntraVENous New Bag 3/10/23 0031)   LORazepam (ATIVAN) injection 1 mg (1 mg IntraVENous Given 3/10/23 0031)   diphenhydrAMINE (BENADRYL) injection 25 mg (25 mg IntraVENous Given 3/10/23 0031)   iopamidol (ISOVUE-370) 76 % injection 75 mL (75 mLs IntraVENous Given 3/10/23 0120)   metronidazole (FLAGYL) 500 mg in 0.9% NaCl 100 mL IVPB premix (500 mg IntraVENous New Bag 3/10/23 0213)             Is this patient to be included in the SEP-1 Core Measure due to severe sepsis or septic shock?   No   Exclusion criteria - the patient is NOT to be included for SEP-1 Core Measure due to:  May have criteria for sepsis, but does not meet criteria for severe sepsis or septic shock    CONSULTS: (Who and What was discussed)  None  Discussion with Other Profesionals : Admitting Team      Social Determinants : None    Records Reviewed : None    CC/HPI Summary, DDx, ED Course, and Reassessment: Initially tachycardic to 130.  Feels like his jaws locked up on both sides and painful on both sides of his jaw.  He is ambulatory moving both extremities with pulses and sensation intact on exam.  After Benadryl and Ativan symptoms and strong face resolved he feels improved.  Is able to talk and move his jaw freely.  He does have an elevated white blood cell count and lactic acid.  Given IV fluids.  Will repeat lactic.    I am the Primary Clinician of Record.    FINAL IMPRESSION      1. Numbness    2. Dehydration    3. Lactic acidosis    4. Enterocolitis    5. Cramp and spasm          DISPOSITION/PLAN     DISPOSITION Admitted 03/10/2023 02:50:19 AM      PATIENT REFERRED TO:  No follow-up provider specified.    DISCHARGE MEDICATIONS:  New Prescriptions    No medications on file       DISCONTINUED MEDICATIONS:  Discontinued Medications    No medications on file              (Please note that portions of this note were completed with a voice recognition program.  Efforts were made to edit the dictations but occasionally words are mis-transcribed.)    PATRICK Xavier (electronically signed)           PATRICK Xavier  03/10/23 [id number]

## 2023-03-10 NOTE — H&P
Hospital Medicine History & Physical      PCP: Mariya Mccurdy DO    Date of Admission: 3/9/2023    Date of Service: Pt seen/examined on 3/9/2023 and admitted to inpatient    Chief Complaint: Clenched jaw, nausea and vomiting and diarrhea,? Altered mental status      History Of Present Illness: The patient is a 36 y.o. male with past medical history apparently as below and continues to indulge in alcohol although knows he has liver issues who presents to [facility] with apparently coming from home after having GI symptoms with nausea vomiting and diarrhea although he notes it only started after he had drank an old bottle of wine but apparently to the providers he noted it was occurring the whole day. He apparently came in to the ED also having clenched jaw and cramping of joints and muscles but he was apparently responsive and not altered at all. He did not have any bowel or bladder incontinence suggestive of a seizure. He has no history of seizures. Did not take any medication that could have caused the syndrome. With regards to his nausea and vomiting and diarrhea, he has not had any other fever or chills. He did not acquire this GI syndrome from any other family members according to what he states. No one at bedside to confirm the story however. Denies any other recent symptoms of fever, chills, dizziness, syncope, chest pain, shortness of breath, dysuria, blood in urine/stool/sputum. He works as  but denies that he was in contact with any electrical devices and denies any recent trauma of note. Past Medical History:        Diagnosis Date    Bursitis of knee     COVID     Gout 2019    Pneumothorax        Past Surgical History:    History reviewed. No pertinent surgical history.     Medications Prior to Admission:    Prior to Admission medications    Medication Sig Start Date End Date Taking? Authorizing Provider   allopurinol (ZYLOPRIM) 300 MG tablet Take 1 tablet by mouth once a week 8/29/22   Aureliano Gonzalez,    cyanocobalamin ([facility] VITAMIN B12) 1000 MCG tablet Take 1 tablet by mouth daily 8/29/22   Vasile Loretta, DO   Multiple Vitamins-Minerals (MULTIVITAMIN WITH MINERALS) tablet Take 1 tablet by mouth daily 8/29/22   Vasile Loretta, DO   folic acid (FOLVITE) 1 MG tablet Take 1 tablet by mouth daily 8/29/22   Vasile Loretta, DO       Allergies:  Patient has no known allergies. Social History:  The patient currently lives home    TOBACCO:   reports that he quit smoking about 10 years ago. His smoking use included cigarettes. He smoked an average of .5 packs per day. He has never used smokeless tobacco.  ETOH:   reports current alcohol use of about 4.0 standard drinks per week. Family History:  Reviewed in detail and negative for DM, Early CAD, Cancer, CVA. Positive as follows:        Problem Relation Age of Onset    No Known Problems Mother     High Blood Pressure Father     High Cholesterol Father     Diabetes Father     No Known Problems Brother     Cancer Paternal Grandfather         thinks colon       REVIEW OF SYSTEMS:    and as noted in the HPI. All other systems reviewed and negative. PHYSICAL EXAM:    BP (!) 143/96   Pulse (!) 117   Temp 98.4 °F (36.9 °C) (Oral)   Resp 20   Wt 154 lb 12.2 oz (70.2 kg)   SpO2 98%   BMI 23.53 kg/m²     General appearance: Seems alert and oriented at this time, uncertain if somewhat intoxicated but seems to be speaking overall clearly. Nontoxic appearing at this time  HEENT Normal cephalic, atraumatic without obvious deformity. Pupils equal, round, and reactive to light. Extra ocular muscles intact.   Dry mucous membranes, anicteric sclera,  Neck: Supple, no JVD  Lungs: Clear breath sounds bilaterally  Heart: Tachycardic but regular rhythm, no murmurs  Abdomen: Soft, hyperactive bowel sounds, more tender to the lower quadrants bilaterally than the upper quadrants,  Extremities: No edema  Skin: No rashes  Neurologic: Grossly intact neurologically  Mental status: Alert, oriented, thought content appropriate. Capillary Refill: Acceptable  < 3 seconds  Peripheral Pulses: +3 Easily felt, not easily obliterated with pressure    CT of the abdomen with IV contrast  1. Fluid-filled but nondilated loops of small bowel and fluid-filled colon. Questionable thickening of the wall and several loops of small bowel in the   right lower quadrant but could be from the under distended state. Overall   features still indicate enterocolitis and diarrhea. 2.  No bowel obstruction, abscess, or pneumoperitoneum. 3.  Diffuse fatty metamorphosis of the liver. 4.  Mild concentric thickening in the distal esophagus and correlate for   esophagitis. Chest x-ray: No acute process  CT head without contrast: No acute process      CT chest PE study with IV contrast:No pulmonary embolism. 2. The lungs are clear. 3. Severe mucosal thickening of the distal esophagus suggesting underlying esophagitis. 4. Hepatomegaly and diffuse steatosis. CBC   Recent Labs     03/10/23  0013 03/10/23  0507   WBC 13.5* 10.7   HGB 15.0 13.1*   HCT 43.5 38.4*    224      RENAL  Recent Labs     03/10/23  0013 03/10/23  0507    137   K 3.9 4.2   CL 96* 102   CO2 18* 19*   BUN 9 10   CREATININE 0.8* 0.9     LFT'S  Recent Labs     03/10/23  0013 03/10/23  0507   AST 54* 40*   ALT 61* 48*   BILITOT 0.7 0.5   ALKPHOS 67 54     COAG  No results for input(s): INR in the last 72 hours.   CARDIAC ENZYMES  Recent Labs     03/10/23  0013   CKTOTAL 328*   TROPONINI <0.01       U/A:    Lab Results   Component Value Date/Time    COLORU Yellow 03/10/2023 12:13 AM    WBCUA 1 03/10/2023 12:13 AM    RBCUA 1 03/10/2023 12:13 AM    BACTERIA None Seen 03/10/2023 12:13 AM    CLARITYU Clear 03/10/2023 12:13 AM    SPECGRAV 1.020 03/10/2023 12:13 AM    LEUKOCYTESUR Negative 03/10/2023 12:13 AM    BLOODU TRACE-INTACT 03/10/2023 12:13 AM    GLUCOSEU Negative 03/10/2023 12:13 AM       ABG  No results found for: KJW5AHS, BEART, Q0YZDKYM, PHART, THGBART, ESJ8FHK, PO2ART, VWP0LRU        Active Hospital Problems    Diagnosis Date Noted    Sepsis ([facility] [address] [A41.9] 03/10/2023     Priority: Medium    Colitis [K52.9] 03/10/2023     Priority: Medium    Alcoholism ([facility] [address] [F10.20] 03/10/2023     Priority: Medium         PHYSICIANS CERTIFICATION:    I certify that Donavan Norton is expected to be hospitalized for greater than 2 midnights based on the following assessment and plan:      ASSESSMENT/PLAN:  Sepsis  Colitis  Alcoholism      Plan:  Apparently patient when he was brought in and had syndrome that sound like a dystonic reaction where he had clenched jaw but was still conscious and alert and oriented and was responding to ED providers but was just having difficulty actually verbalizing due to clenched jaw and some component of contractions. He is completely back to normal at this time. I do not suspect seizure activity but need to witness the episode myself to figure out if it is anything of otherwise concern. Might be related to alcohol use and he notes that he did drink an old bottle of wine that he drank right before the symptoms and the nausea and vomiting started  We will treat patient for sepsis syndrome from colitis   Start patient on ciprofloxacin and Flagyl, continue  Start patient on oral folic acid/multivitamin/thiamine for alcohol abuse  [facility] protocol for now but will not order Ativan for now  Checking C. difficile for now  Repeat labs daily  Continue patient 100/h of normal saline for IV fluid hydration  Restart patient's other home medications    DVT Prophylaxis: Lovenox  Diet: ADULT DIET;  Regular  Code Status: Full Code  PT/OT Eval Status: Ambulatory    Dispo -pending clinical course       Esdras Hooker, DO    Thank you Bhargav Dacosta DO for the opportunity to be involved in this patient's care. If you have any questions or concerns please feel free to contact me at [phone].

## 2023-03-10 NOTE — CARE COORDINATION
Case Management Discharge Note          Date / Time of Note: 3/10/2023 2:24 PM                  Patient Name: Socorro Perdue   YOB: 1980  Diagnosis: Dehydration [E86.0]  Enterocolitis [K52.9]  Lactic acidosis [E87.20]  Numbness [R20.0]  Sepsis ([facility] [address] [A41.9]  Cramp and spasm [R25.2]   Date / Time: 3/9/2023 11:39 PM    Financial:  Payor: Vivek Finch / Plan: Macie Mike / Product Type: *No Product type* /      Pharmacy:    [facility] [id number] [address]. [facility] [phone] -  [phone]  [address] [place] [address]  Phone: [phone] Fax: [phone]    [facility] [address] [place] [address], [facility] [phone] [facility] [phone]  [address] [address]  Phone: [phone] Fax: [phone]      Assistance purchasing medications?: Potential Assistance Purchasing Medications: No  Assistance provided by Case Management: None at this time    DISCHARGE Disposition: Home- No Services Needed    Community Service Affiliation:   [address] [place] [address] Ave: Yes - substance abuse treatment resources provided to patient at bedside. Patient plans to contact at discharge and declined referral at this time. Transportation:  Transportation PLAN for discharge: family   Mode of Transport: Private Car  Time of Transport: 1500    Additional CM Notes: patient plans to return home with family. Patient is independent, and has community resources. Patient declined other needs at this time.      The Plan for Transition of Care is related to the following treatment goals of Dehydration [E86.0]  Enterocolitis [K52.9]  Lactic acidosis [E87.20]  Numbness [R20.0]  Sepsis ([address] [A41.9]  Cramp and spasm [R25.2]      LESA Liriano  [id number]  LESA Bonilla, [place], Social Work/Case Management   [phone]  Electronically signed by LESA Liriano on 3/10/2023 at 2:24 PM

## 2023-03-10 NOTE — DISCHARGE SUMMARY
Hospital Medicine Discharge Summary      Patient Identification:   Franny Banda   : 1980  MRN: [id number]   Account: [de-identified]      Patient's PCP: Abelardo Henderson DO    Admit Date: 3/9/2023     Discharge Date:   3/10/2023    Admitting Physician: Isabella Morales DO     Discharge Physician: Sherry Dunn MD     Consults:     IP CONSULT TO SOCIAL WORK    Disposition: Home    Condition at Discharge: Stable    Code Status:  Full Code        Discharge Diagnoses:    Lockjaw, differential diagnosis including muscle spasm, dystonia, temporomandibular joint dysfunction. No clinical findings to support seizure. Resolved. Alcohol dependence, no withdrawal  Dehydration/lactic acidosis likely secondary to diarrhea, alcohol abuse, sepsis ruled out. Gastroenteritis  GERD with esophagitis        Hospital Course:   Franny Banda is a 36 y.o. male hospitalized   3/9/2023   lockjaw, diarrhea and alcohol intoxication, patient was found to be dehydrated with lactic acidosis, nonspecific leukocytosis, no fever. CT scan of the chest, abdomen revealed esophagitis, picture of colitis. Treated with supportive care, IV fluid, lactic acidosis resolved. Lo-Trol resolved. Patient was able to tolerate diet without difficulty. Discharged on Protonix, short course of azithromycin. Preliminary culture data are are negative. Patient encouraged to avoid alcohol completely. .        Patient seen and examined in the day of discharge. Vital signs reviewed. BP (!) 141/93   Pulse (!) 117   Temp 98.4 °F (36.9 °C) (Oral)   Resp 20   Wt 154 lb 12.2 oz (70.2 kg)   SpO2 98%   BMI 23.53 kg/m²     Patient alert, oriented, comfortable, no acute distress, clear lungs, normal first and second heart sound, abdomen soft and nontender. *. Patient reached the maximum benefit of this hospitalization. Patient  was hemodynamically stable on discharge   Available consultant are in agreement with discharge planning.   Patient symptoms was controlled and in agreement with discharge planning. Patient Instructions:    Discharge lab/important testing/finding that need follow up :  Alcohol abstinence    Activity: activity as tolerated    Diet: ADULT DIET; Regular      Follow-up visits:   No follow-up provider specified. Discharge Medications:       Current Discharge Medication List        START taking these medications    Details   azithromycin (ZITHROMAX) 500 MG tablet Take 1 tablet by mouth daily for 3 days  Qty: 3 tablet, Refills: 0      pantoprazole (PROTONIX) 20 MG tablet Take 2 tablets by mouth daily  Qty: 30 tablet, Refills: 3           Current Discharge Medication List        Current Discharge Medication List        CONTINUE these medications which have NOT CHANGED    Details   allopurinol (ZYLOPRIM) 300 MG tablet Take 1 tablet by mouth once a week  Qty: 12 tablet, Refills: 3      cyanocobalamin ([facility] VITAMIN B12) 1000 MCG tablet Take 1 tablet by mouth daily  Qty: 90 tablet, Refills: 3      Multiple Vitamins-Minerals (MULTIVITAMIN WITH MINERALS) tablet Take 1 tablet by mouth daily  Qty: 90 tablet, Refills: 3      folic acid (FOLVITE) 1 MG tablet Take 1 tablet by mouth daily  Qty: 90 tablet, Refills: 3           Current Discharge Medication List            Labs:  For convenience and continuity at follow-up the following most recent labs are provided:      CBC:    Lab Results   Component Value Date/Time    WBC 10.7 03/10/2023 05:07 AM    HGB 13.1 03/10/2023 05:07 AM    HCT 38.4 03/10/2023 05:07 AM     03/10/2023 05:07 AM       Renal:    Lab Results   Component Value Date/Time     03/10/2023 05:07 AM    K 4.2 03/10/2023 05:07 AM     03/10/2023 05:07 AM    CO2 19 03/10/2023 05:07 AM    BUN 10 03/10/2023 05:07 AM    CREATININE 0.9 03/10/2023 05:07 AM    CALCIUM 8.5 03/10/2023 05:07 AM         Significant Diagnostic Studies    Radiology:   CT ABDOMEN PELVIS W IV CONTRAST Additional Contrast? None   Final Result   1. Fluid-filled but nondilated loops of small bowel and fluid-filled colon. Questionable thickening of the wall and several loops of small bowel in the   right lower quadrant but could be from the under distended state. Overall   features still indicate enterocolitis and diarrhea. 2.  No bowel obstruction, abscess, or pneumoperitoneum. 3.  Diffuse fatty metamorphosis of the liver. 4.  Mild concentric thickening in the distal esophagus and correlate for   esophagitis. CT CHEST PULMONARY EMBOLISM W CONTRAST   Final Result   1. No pulmonary embolism. 2. The lungs are clear. 3. Severe mucosal thickening of the distal esophagus suggesting underlying   esophagitis. 4. Hepatomegaly and diffuse steatosis. XR CHEST PORTABLE   Final Result   1. No evidence of acute cardiopulmonary disease. CT HEAD WO CONTRAST   Final Result   No acute intracranial abnormality. Time Spent on discharge is 35 in the examination, evaluation, counseling and review of medications and discharge plan. Thank you Dimas Valverde DO for the opportunity to be involved in this patient's care.          Electronically signed by Tasha Arroyo MD on 3/10/2023 at 2:02 PM

## 2023-03-11 LAB
BLOOD CULTURE, ROUTINE: NORMAL
CULTURE, BLOOD 2: NORMAL

## 2023-03-14 ENCOUNTER — TELEPHONE (OUTPATIENT)
Dept: FAMILY MEDICINE CLINIC | Age: 43
End: 2023-03-14

## 2023-03-14 LAB
BACTERIA BLD CULT ORG #2: NORMAL
BACTERIA BLD CULT: NORMAL

## 2023-03-16 NOTE — PROGRESS NOTES
Physician Progress Note      Annie Gimenez  CSN #:                  [id number]  :                       1980  ADMIT DATE:       3/9/2023 11:39 PM  [address] DATE:        3/10/2023 3:49 PM  RESPONDING  PROVIDER #:        Willis Chi MD          QUERY TEXT:    Pt admitted with lockjaw  and has colitis documented. If possible, please   document the type of colitis in the medical record. The medical record reflects the following:  Risk Factors: alcohol abuse, colitis,  Clinical Indicators: documentation per h&p of Colitis,  nausea vomiting and   diarrhea although he notes it only started after he had drank an old bottle of   wine but apparently to the providers he noted it was occurring the whole day. wbc 13.5, lactic acid-5.5,  CT abd-. ? Fluid-filled but nondilated loops of small bowel and fluid-filled   colon. Questionable thickening of the wall and several loops of small bowel in   the right lower quadrant but could be from the under distended state. ? Overall features still indicate enterocolitis and diarrhea. Treatment: iv cipro, iv flagyl, discharged in po levaquin    Thank you, Forrest Pritchett RN CDS CRCR CCDS  [email or web address]  Options provided:  -- Treating for a Bacterial Colitis  -- Toxic Colitis  -- Colitis due to other etiology, Please document other etiology. -- Other - I will add my own diagnosis  -- Disagree - Not applicable / Not valid  -- Disagree - Clinically unable to determine / Unknown  -- Refer to Clinical Documentation Reviewer    PROVIDER RESPONSE TEXT:    This patient is being treated for a bacterial colitis. Query created by: Adam Pritchett on 3/14/2023 11:11 AM      Electronically signed by:   Willis Chi MD 3/16/2023 1:44 PM

## 2023-10-05 RX ORDER — LANOLIN ALCOHOL/MO/W.PET/CERES
1000 CREAM (GRAM) TOPICAL DAILY
Qty: 90 TABLET | Refills: 3 | Status: SHIPPED | OUTPATIENT
Start: 2023-10-05

## 2023-10-05 NOTE — TELEPHONE ENCOUNTER
Medication:   Requested Prescriptions     Pending Prescriptions Disp Refills    vitamin B-12 (CYANOCOBALAMIN) 1000 MCG tablet [Pharmacy Med Name: VITAMIN B-12 1,000 MCG TABLET] 30 tablet 2     Sig: TAKE ONE TABLET BY MOUTH DAILY        Last Filled:  08/29/2022    Patient Phone Number: [phone] (home)     Last appt: 8/29/2022   Next appt: Visit date not found    Last OARRS:        No data to display

## 2024-04-09 NOTE — TELEPHONE ENCOUNTER
Medication:   Requested Prescriptions     Pending Prescriptions Disp Refills    allopurinol (ZYLOPRIM) 300 MG tablet [Pharmacy Med Name: ALLOPURINOL 300 MG TABLET] 12 tablet 3     Sig: TAKE ONE TABLET BY MOUTH ONCE WEEKLY        Last Filled:  08/03/23    Patient Phone Number: [phone] (home)     Last appt: 8/29/2022   Next appt: Visit date not found    Last OARRS:        No data to display

## 2024-04-11 RX ORDER — ALLOPURINOL 300 MG/1
TABLET ORAL
Qty: 12 TABLET | Refills: 3 | Status: SHIPPED | OUTPATIENT
Start: 2024-04-11

## 2024-11-21 RX ORDER — LANOLIN ALCOHOL/MO/W.PET/CERES
1000 CREAM (GRAM) TOPICAL DAILY
Qty: 90 TABLET | Refills: 3 | OUTPATIENT
Start: 2024-11-21

## 2024-11-22 NOTE — TELEPHONE ENCOUNTER
Called Albert and left VM to call office to set up appt to est care has not been seen in over 2 years.

## 2025-01-27 ENCOUNTER — OFFICE VISIT (OUTPATIENT)
Dept: FAMILY MEDICINE CLINIC | Age: 45
End: 2025-01-27
Payer: COMMERCIAL

## 2025-01-27 VITALS
SYSTOLIC BLOOD PRESSURE: 138 MMHG | OXYGEN SATURATION: 99 % | RESPIRATION RATE: 20 BRPM | DIASTOLIC BLOOD PRESSURE: 92 MMHG | HEART RATE: 87 BPM | BODY MASS INDEX: 24.25 KG/M2 | HEIGHT: 68 IN | WEIGHT: 160 LBS

## 2025-01-27 DIAGNOSIS — S93.432A SPRAIN OF TIBIOFIBULAR LIGAMENT OF LEFT ANKLE, INITIAL ENCOUNTER: ICD-10-CM

## 2025-01-27 DIAGNOSIS — M10.072 ACUTE IDIOPATHIC GOUT OF LEFT FOOT: Primary | ICD-10-CM

## 2025-01-27 PROCEDURE — G8420 CALC BMI NORM PARAMETERS: HCPCS | Performed by: INTERNAL MEDICINE

## 2025-01-27 PROCEDURE — 1036F TOBACCO NON-USER: CPT | Performed by: INTERNAL MEDICINE

## 2025-01-27 PROCEDURE — G8427 DOCREV CUR MEDS BY ELIG CLIN: HCPCS | Performed by: INTERNAL MEDICINE

## 2025-01-27 PROCEDURE — 99213 OFFICE O/P EST LOW 20 MIN: CPT | Performed by: INTERNAL MEDICINE

## 2025-01-27 RX ORDER — ALLOPURINOL 300 MG/1
300 TABLET ORAL DAILY
Qty: 90 TABLET | Refills: 3 | Status: SHIPPED | OUTPATIENT
Start: 2025-01-27

## 2025-01-27 RX ORDER — PREDNISONE 10 MG/1
TABLET ORAL
Qty: 40 TABLET | Refills: 0 | Status: SHIPPED | OUTPATIENT
Start: 2025-01-27 | End: 2025-02-12

## 2025-01-27 RX ORDER — PREDNISONE 10 MG/1
TABLET ORAL
Qty: 40 TABLET | Refills: 0 | Status: SHIPPED | OUTPATIENT
Start: 2025-01-27 | End: 2025-01-27

## 2025-01-27 RX ORDER — ALLOPURINOL 300 MG/1
300 TABLET ORAL DAILY
Qty: 90 TABLET | Refills: 3 | Status: SHIPPED | OUTPATIENT
Start: 2025-01-27 | End: 2025-01-27

## 2025-01-27 SDOH — ECONOMIC STABILITY: FOOD INSECURITY: WITHIN THE PAST 12 MONTHS, THE FOOD YOU BOUGHT JUST DIDN'T LAST AND YOU DIDN'T HAVE MONEY TO GET MORE.: NEVER TRUE

## 2025-01-27 SDOH — ECONOMIC STABILITY: FOOD INSECURITY: WITHIN THE PAST 12 MONTHS, YOU WORRIED THAT YOUR FOOD WOULD RUN OUT BEFORE YOU GOT MONEY TO BUY MORE.: NEVER TRUE

## 2025-01-27 ASSESSMENT — PATIENT HEALTH QUESTIONNAIRE - PHQ9
SUM OF ALL RESPONSES TO PHQ QUESTIONS 1-9: 0
2. FEELING DOWN, DEPRESSED OR HOPELESS: NOT AT ALL
SUM OF ALL RESPONSES TO PHQ QUESTIONS 1-9: 0
SUM OF ALL RESPONSES TO PHQ9 QUESTIONS 1 & 2: 0
SUM OF ALL RESPONSES TO PHQ QUESTIONS 1-9: 0
1. LITTLE INTEREST OR PLEASURE IN DOING THINGS: NOT AT ALL
SUM OF ALL RESPONSES TO PHQ QUESTIONS 1-9: 0

## 2025-01-27 NOTE — PATIENT INSTRUCTIONS
If knee gets more red, warm, or painful, go to ED or call me to see if we can get you into an orthopedist.     Take allopurnol every day    In 3 months lets plan to recheck your uric acid

## 2025-01-27 NOTE — PROGRESS NOTES
[facility] - Primary Care   PCP progress note     Patient: Albert Badillo : 1980  Sex: male  MRN: [id number]    Assessment and Plan:     Assessment & Plan  Acute idiopathic gout of left foot  More likely gout rather than septic joint, does not appear to be overly red warm and tender, will prescribe prednisone taper.  Advised that if gets any worse please go to emergency department or call us to get arranged with orthopedics    Discussed with patient that taking allopurinol 300 mg once weekly is not doing anything to help prevent gout, will need to change his taking this daily.  Additionally recommended follow-up in 3 months and we will check uric acid levels at that time.  Also needs new patient appointment.         Sprain of tibiofibular ligament of left ankle, initial encounter  Very likely has sprain as well, but able to walk on it and overall seems more consistent with a gout attack              -Risks and benefit as well as most common side effects of new medications were discussed with patient.  -Recommended immunizations and screenings as noted in patients care gaps report. Patient was agreeable to screening tests for appropriate indications as listed below       Patient's questions answered. Additional information printed on AVS.  Patient/Caregiver verbalizes understanding of plan of care/instructions discussed today.       LOS Today       Follow-up Information  Return in about 3 months (around 2025) for New patient .      Subjective:  HPI:   Chief Complaint   Patient presents with    Foot Pain     Left foot pain and knee pain. Worst with ROM. Started with a sprained ankle last sat.         Patient reports that he was snowboarding about a week ago and tripped over left foot has been painful since then, but has severely increased.  It is very tender to the touch, difficult when he is sleeping and his foot touches and it will be very painful now has redness in his knee that is somewhat warm.

## 2025-04-28 ENCOUNTER — OFFICE VISIT (OUTPATIENT)
Dept: FAMILY MEDICINE CLINIC | Age: 45
End: 2025-04-28
Payer: COMMERCIAL

## 2025-04-28 VITALS
WEIGHT: 162 LBS | OXYGEN SATURATION: 98 % | SYSTOLIC BLOOD PRESSURE: 130 MMHG | HEART RATE: 88 BPM | DIASTOLIC BLOOD PRESSURE: 86 MMHG | HEIGHT: 68 IN | BODY MASS INDEX: 24.55 KG/M2 | RESPIRATION RATE: 16 BRPM

## 2025-04-28 DIAGNOSIS — R79.89 ELEVATED LFTS: ICD-10-CM

## 2025-04-28 DIAGNOSIS — F10.20 ALCOHOLISM (HCC): ICD-10-CM

## 2025-04-28 DIAGNOSIS — K21.00 GASTROESOPHAGEAL REFLUX DISEASE WITH ESOPHAGITIS, UNSPECIFIED WHETHER HEMORRHAGE: ICD-10-CM

## 2025-04-28 DIAGNOSIS — M10.9 ACUTE GOUT OF RIGHT KNEE, UNSPECIFIED CAUSE: Primary | ICD-10-CM

## 2025-04-28 DIAGNOSIS — J32.9 RHINOSINUSITIS: ICD-10-CM

## 2025-04-28 DIAGNOSIS — Z11.59 NEED FOR HEPATITIS C SCREENING TEST: ICD-10-CM

## 2025-04-28 DIAGNOSIS — Z13.6 ENCOUNTER FOR SCREENING FOR CARDIOVASCULAR DISORDERS: ICD-10-CM

## 2025-04-28 DIAGNOSIS — Z12.11 ENCOUNTER FOR SCREENING FOR MALIGNANT NEOPLASM OF COLON: ICD-10-CM

## 2025-04-28 DIAGNOSIS — Z13.1 ENCOUNTER FOR SCREENING FOR DIABETES MELLITUS: ICD-10-CM

## 2025-04-28 LAB
ALBUMIN SERPL-MCNC: 4.7 G/DL (ref 3.4–5)
ALBUMIN/GLOB SERPL: 1.7 {RATIO} (ref 1.1–2.2)
ALP SERPL-CCNC: 55 U/L (ref 40–129)
ALT SERPL-CCNC: 58 U/L (ref 10–40)
ANION GAP SERPL CALCULATED.3IONS-SCNC: 13 MMOL/L (ref 3–16)
AST SERPL-CCNC: 42 U/L (ref 15–37)
BASOPHILS # BLD: 0 K/UL (ref 0–0.2)
BASOPHILS NFR BLD: 0.4 %
BILIRUB SERPL-MCNC: <0.2 MG/DL (ref 0–1)
BUN SERPL-MCNC: 11 MG/DL (ref 7–20)
CALCIUM SERPL-MCNC: 10.1 MG/DL (ref 8.3–10.6)
CHLORIDE SERPL-SCNC: 102 MMOL/L (ref 99–110)
CHOLEST SERPL-MCNC: 293 MG/DL (ref 0–199)
CO2 SERPL-SCNC: 23 MMOL/L (ref 21–32)
CREAT SERPL-MCNC: 0.7 MG/DL (ref 0.9–1.3)
DEPRECATED RDW RBC AUTO: 12.5 % (ref 12.4–15.4)
EOSINOPHIL # BLD: 0.1 K/UL (ref 0–0.6)
EOSINOPHIL NFR BLD: 1.2 %
EST. AVERAGE GLUCOSE BLD GHB EST-MCNC: 108.3 MG/DL
GFR SERPLBLD CREATININE-BSD FMLA CKD-EPI: >90 ML/MIN/{1.73_M2}
GLUCOSE SERPL-MCNC: 112 MG/DL (ref 70–99)
HBA1C MFR BLD: 5.4 %
HCT VFR BLD AUTO: 40.1 % (ref 40.5–52.5)
HCV AB SERPL QL IA: NORMAL
HDLC SERPL-MCNC: 33 MG/DL (ref 40–60)
HGB BLD-MCNC: 14 G/DL (ref 13.5–17.5)
LDLC SERPL CALC-MCNC: ABNORMAL MG/DL
LDLC SERPL-MCNC: 90 MG/DL
LYMPHOCYTES # BLD: 1.8 K/UL (ref 1–5.1)
LYMPHOCYTES NFR BLD: 25.4 %
MCH RBC QN AUTO: 32.7 PG (ref 26–34)
MCHC RBC AUTO-ENTMCNC: 34.9 G/DL (ref 31–36)
MCV RBC AUTO: 93.8 FL (ref 80–100)
MONOCYTES # BLD: 0.5 K/UL (ref 0–1.3)
MONOCYTES NFR BLD: 6.7 %
NEUTROPHILS # BLD: 4.7 K/UL (ref 1.7–7.7)
NEUTROPHILS NFR BLD: 66.3 %
PLATELET # BLD AUTO: 252 K/UL (ref 135–450)
PMV BLD AUTO: 10.7 FL (ref 5–10.5)
POTASSIUM SERPL-SCNC: 4.7 MMOL/L (ref 3.5–5.1)
PROT SERPL-MCNC: 7.5 G/DL (ref 6.4–8.2)
RBC # BLD AUTO: 4.27 M/UL (ref 4.2–5.9)
SODIUM SERPL-SCNC: 138 MMOL/L (ref 136–145)
TRIGL SERPL-MCNC: 1152 MG/DL (ref 0–150)
URATE SERPL-MCNC: 8.1 MG/DL (ref 3.5–7.2)
VLDLC SERPL CALC-MCNC: ABNORMAL MG/DL
WBC # BLD AUTO: 7 K/UL (ref 4–11)

## 2025-04-28 PROCEDURE — G8420 CALC BMI NORM PARAMETERS: HCPCS | Performed by: INTERNAL MEDICINE

## 2025-04-28 PROCEDURE — 99214 OFFICE O/P EST MOD 30 MIN: CPT | Performed by: INTERNAL MEDICINE

## 2025-04-28 PROCEDURE — 1036F TOBACCO NON-USER: CPT | Performed by: INTERNAL MEDICINE

## 2025-04-28 PROCEDURE — 36415 COLL VENOUS BLD VENIPUNCTURE: CPT | Performed by: INTERNAL MEDICINE

## 2025-04-28 PROCEDURE — G8427 DOCREV CUR MEDS BY ELIG CLIN: HCPCS | Performed by: INTERNAL MEDICINE

## 2025-04-28 PROCEDURE — G2211 COMPLEX E/M VISIT ADD ON: HCPCS | Performed by: INTERNAL MEDICINE

## 2025-04-28 NOTE — PATIENT INSTRUCTIONS
Try flonase for congestion and morning cough  
worsening symptoms. I spent 23 minutes providing this service today, to include the time spent seeing the patient, documenting, and reviewing chart excluding any separately billed procedures. Electronically signed by Padmini Milan MD on 11/27/2023 at 12:43 PM.    Disclaimer: This note was dictated with voice recognition software. Though review and correction are routine, we apologize for any errors.

## 2025-04-28 NOTE — PROGRESS NOTES
[facility] - Primary Care   PCP progress note     Patient: Albert Badillo : 1980  Sex: male  MRN: [id number]    Assessment and Plan:     Assessment & Plan  Gout  - No current pain  - Advised daily allopurinol use  - Uric acid levels to be checked today    Alcohol use disorder  - Consumes 4 beers daily  - Discussed naltrexone for cravings; patient not interested  - Liver enzymes to be checked  - Counseling provided on reducing intake and future naltrexone use    GERD  - Occasional heartburn managed with Tums  - Reassured safety of long-term medication use but noted potential vitamin/mineral absorption issues  - Advised continued use of OTC medications as needed    Sinus drainage  - Morning cough likely due to sinus drainage  - Recommended Flonase and non-drowsy antihistamines    Elevated LFTs  - Noted in pt history. Likely due to alcohol use. May interfere with naltrexone use if wanting to pursue this in future. Will get new baseline today.     Health maintenance  - Cologuard test to be ordered for colorectal cancer screening  - Comprehensive blood panel, including cholesterol and liver enzymes, to be ordered  - Hepatitis C screening to be conducted    Follow-up  - 1 year          ICD-10-CM    1. Acute gout of right knee, unspecified cause  M10.9 Comprehensive Metabolic Panel     CBC with Auto Differential     Lipid Panel     Uric Acid      2. Alcoholism (HCC)  F10.20 Comprehensive Metabolic Panel     CBC with Auto Differential     Lipid Panel      3. Gastroesophageal reflux disease with esophagitis, unspecified whether hemorrhage  K21.00       4. Encounter for screening for cardiovascular disorders  Z13.6 Comprehensive Metabolic Panel     CBC with Auto Differential     Lipid Panel      5. Need for hepatitis C screening test  Z11.59 Hepatitis C Antibody      6. Encounter for screening for malignant neoplasm of colon  Z12.11 FIT-DNA (Cologuard)      7. Encounter for screening for diabetes mellitus  Z13.1

## 2025-04-29 ENCOUNTER — RESULTS FOLLOW-UP (OUTPATIENT)
Dept: FAMILY MEDICINE CLINIC | Age: 45
End: 2025-04-29

## 2025-04-29 DIAGNOSIS — E79.0 HIGH BLOOD URIC ACID LEVEL: ICD-10-CM

## 2025-04-29 DIAGNOSIS — E78.1 HYPERTRIGLYCERIDEMIA: Primary | ICD-10-CM

## 2025-04-29 RX ORDER — ROSUVASTATIN CALCIUM 20 MG/1
20 TABLET, COATED ORAL DAILY
Qty: 90 TABLET | Refills: 1 | Status: SHIPPED | OUTPATIENT
Start: 2025-04-29

## 2025-04-30 NOTE — TELEPHONE ENCOUNTER
Sasha Taylor MA  4/30/2025 12:11 PM EDT Back to Top      Lvm to call the office back    Scott Mims MD  4/29/2025  7:27 AM EDT       Triglycerides are very high putting you at risk of developing pancreatitis, inflammation of the pancrease that can be life threatening and extremely painful.  High triglycerides likely due to carbohydrate/alcohol consumption though may be familial as well.  I would recommend we start rosuvastatin 20mg, reduce alcohol intake, and recheck in 1 month.     Uric acid is high, start taking allopurinol every day.     You do not have diabetes, you do not have hepatitis C.   Pt calling office back for results

## 2025-05-08 LAB — NONINV COLON CA DNA+OCC BLD SCRN STL QL: NORMAL

## 2025-05-21 ENCOUNTER — OFFICE VISIT (OUTPATIENT)
Dept: FAMILY MEDICINE CLINIC | Age: 45
End: 2025-05-21
Payer: COMMERCIAL

## 2025-05-21 VITALS
WEIGHT: 161 LBS | RESPIRATION RATE: 18 BRPM | BODY MASS INDEX: 24.4 KG/M2 | SYSTOLIC BLOOD PRESSURE: 128 MMHG | OXYGEN SATURATION: 99 % | DIASTOLIC BLOOD PRESSURE: 68 MMHG | HEART RATE: 96 BPM | HEIGHT: 68 IN

## 2025-05-21 DIAGNOSIS — E78.1 HYPERTRIGLYCERIDEMIA: ICD-10-CM

## 2025-05-21 DIAGNOSIS — J06.9 UPPER RESPIRATORY TRACT INFECTION, UNSPECIFIED TYPE: Primary | ICD-10-CM

## 2025-05-21 PROCEDURE — 99213 OFFICE O/P EST LOW 20 MIN: CPT | Performed by: INTERNAL MEDICINE

## 2025-05-21 PROCEDURE — 1036F TOBACCO NON-USER: CPT | Performed by: INTERNAL MEDICINE

## 2025-05-21 PROCEDURE — G8427 DOCREV CUR MEDS BY ELIG CLIN: HCPCS | Performed by: INTERNAL MEDICINE

## 2025-05-21 PROCEDURE — G8420 CALC BMI NORM PARAMETERS: HCPCS | Performed by: INTERNAL MEDICINE

## 2025-05-21 RX ORDER — AZITHROMYCIN 250 MG/1
TABLET, FILM COATED ORAL
Qty: 6 TABLET | Refills: 0 | Status: SHIPPED | OUTPATIENT
Start: 2025-05-21 | End: 2025-05-31

## 2025-05-21 RX ORDER — PREDNISONE 20 MG/1
20 TABLET ORAL DAILY
Qty: 5 TABLET | Refills: 0 | Status: SHIPPED | OUTPATIENT
Start: 2025-05-21 | End: 2025-05-26

## 2025-05-21 RX ORDER — BENZONATATE 100 MG/1
100 CAPSULE ORAL 3 TIMES DAILY PRN
Qty: 30 CAPSULE | Refills: 0 | Status: SHIPPED | OUTPATIENT
Start: 2025-05-21 | End: 2025-05-31

## 2025-05-21 ASSESSMENT — PATIENT HEALTH QUESTIONNAIRE - PHQ9
1. LITTLE INTEREST OR PLEASURE IN DOING THINGS: NOT AT ALL
2. FEELING DOWN, DEPRESSED OR HOPELESS: NOT AT ALL
SUM OF ALL RESPONSES TO PHQ QUESTIONS 1-9: 0

## 2025-05-21 NOTE — PROGRESS NOTES
[facility] - Primary [facility]   PCP progress note     Patient: Albert Badillo : 1980  Sex: male  MRN: [id number]    Assessment and Plan:     Assessment & Plan  URI  - Likely viral etiology, no evidence of bacterial infection, but has been going on for 3 weeks  - Physical exam: No significant findings  - possibly walking pneumonia  - Initiate azithromycin (Z-Chevy), double dose on day 1, then single pill days 2-5  - Prescribe low-dose steroid  persistent issues    Elevated Triglycerides  - Significantly elevated levels  - Physical exam: No significant findings  - Avoid alcohol, follow low-carbohydrate diet  - Resume rosuvastatin therapy in 3 weeks, starting with half a pill daily or one pill every other day for 2 weeks            ICD-10-CM    1. Upper respiratory tract infection, unspecified type  J06.9                -Risks and benefit as well as most common side effects of new medications were discussed with patient.  -Recommended immunizations and screenings as noted in patients care gaps report. Patient was agreeable to screening tests for appropriate indications as listed above       Patient's questions answered. Additional information printed on AVS.  Patient/Caregiver verbalizes understanding of plan of care/instructions discussed today.       LOS Today       Follow-up Information  Return if symptoms worsen or fail to improve.      Subjective:  HPI:   Chief Complaint   Patient presents with    Medication Reaction     When starting new med rosuvastatin night sweats, h/a, cotton mouth, chills, worsening dry cough, ED, decrease appetite, constipation         History of Present Illness  The patient presents with persistent dry cough, night sweats, dizziness, erectile dysfunction, and other symptoms that began 2.5 weeks ago.    He reports a severe dry cough, particularly disruptive at night, causing headaches and waking his girlfriend. Despite no mucus production, he experiences dizziness, which he attributes

## 2025-05-21 NOTE — PATIENT INSTRUCTIONS
Push fluids.     Take azithromycin for the next 5 days.     Take prednisone for the next 5 days.      When your are feeling better in about 3 weeks or so; try the rosuvastatin again and start by either taking 1/2 pill daily or one pill every other day for the first 2 weeks.

## 2025-05-30 LAB — NONINV COLON CA DNA+OCC BLD SCRN STL QL: NORMAL

## 2025-06-14 LAB — NONINV COLON CA DNA+OCC BLD SCRN STL QL: NEGATIVE

## 2025-07-21 ENCOUNTER — OFFICE VISIT (OUTPATIENT)
Dept: ORTHOPEDIC SURGERY | Age: 45
End: 2025-07-21

## 2025-07-21 VITALS — WEIGHT: 158 LBS | BODY MASS INDEX: 23.95 KG/M2 | HEIGHT: 68 IN

## 2025-07-21 DIAGNOSIS — M25.561 ACUTE PAIN OF RIGHT KNEE: Primary | ICD-10-CM

## 2025-07-21 DIAGNOSIS — M10.061 ACUTE IDIOPATHIC GOUT OF RIGHT KNEE: ICD-10-CM

## 2025-07-21 RX ORDER — TRIAMCINOLONE ACETONIDE 40 MG/ML
40 INJECTION, SUSPENSION INTRA-ARTICULAR; INTRAMUSCULAR ONCE
Status: COMPLETED | OUTPATIENT
Start: 2025-07-21 | End: 2025-07-21

## 2025-07-21 RX ORDER — BUPIVACAINE HYDROCHLORIDE 2.5 MG/ML
2 INJECTION, SOLUTION INFILTRATION; PERINEURAL ONCE
Status: COMPLETED | OUTPATIENT
Start: 2025-07-21 | End: 2025-07-21

## 2025-07-21 RX ADMIN — BUPIVACAINE HYDROCHLORIDE 5 MG: 2.5 INJECTION, SOLUTION INFILTRATION; PERINEURAL at 13:52

## 2025-07-21 RX ADMIN — TRIAMCINOLONE ACETONIDE 40 MG: 40 INJECTION, SUSPENSION INTRA-ARTICULAR; INTRAMUSCULAR at 13:52

## 2025-07-21 NOTE — PROGRESS NOTES
[facility] Office Visit  Fahad Be MD    Reason for visit: Acute right knee pain and swelling    HPI:  Albert Badillo is a 45 y.o. who is here for initial evaluation of acute onset of right knee pain and swelling without inciting event.  This has been going on for the past 6 days.  He does have gout and takes allopurinol most days for this.  He reports worsening swelling and pain in the knee since last week.  He has been wearing a knee sleeve.  He denies fevers, chills, recent sickness.  He rates pain as up to 7/10.  He denies other areas of pain.    Past Medical History:   Diagnosis Date    Bursitis of knee     COVID     Gout 2019    Pneumothorax        Exam:  Ht 1.727 m (5' 8\")   Wt 71.7 kg (158 lb)   BMI 24.02 kg/m²     Appearance: sitting in exam room chair, appears to be in no acute distress, awake and alert  Resp: unlabored breathing on room air  Skin: warm, dry and intact with out erythema or significant increased temperature  Neuro: grossly intact both lower extremities. Intact sensation to light touch. Motor exam 4+ to 5/5 in all major motor groups.  RLE: Moderate knee joint effusion.  He demonstrates active knee range of motion of 5 to 90 degrees.  Tender around the knee joint.  Negative Stinchfield examination of the hip.  Motor function and sensation intact distally.    Imagin views of the right knee were performed and interpreted today.  He has maintained joint space with no fractures or dislocations.    Assessment:  Acute right knee pain likely due to gout    Plan:  We discussed the diagnosis and treatment options.  I recommended and performed a right knee aspiration today as described below.  A right knee steroid injection was also performed.  He should continue to take ibuprofen and continue his allopurinol.  He will follow-up.  Next week if his symptoms do not continue to improve.  Is also recommended he follow-up with his PCP to discuss his gout treatment.    Procedure:  After